# Patient Record
Sex: FEMALE | Race: WHITE | Employment: UNEMPLOYED
[De-identification: names, ages, dates, MRNs, and addresses within clinical notes are randomized per-mention and may not be internally consistent; named-entity substitution may affect disease eponyms.]

---

## 2017-09-03 ENCOUNTER — HEALTH MAINTENANCE LETTER (OUTPATIENT)
Age: 52
End: 2017-09-03

## 2018-05-14 ENCOUNTER — OFFICE VISIT (OUTPATIENT)
Dept: HEMATOLOGY | Facility: CLINIC | Age: 53
End: 2018-05-14
Attending: INTERNAL MEDICINE
Payer: COMMERCIAL

## 2018-05-14 VITALS
HEIGHT: 67 IN | SYSTOLIC BLOOD PRESSURE: 138 MMHG | DIASTOLIC BLOOD PRESSURE: 87 MMHG | RESPIRATION RATE: 14 BRPM | HEART RATE: 89 BPM | TEMPERATURE: 98.4 F | OXYGEN SATURATION: 95 %

## 2018-05-14 DIAGNOSIS — Z86.711 HISTORY OF PULMONARY EMBOLISM: ICD-10-CM

## 2018-05-14 DIAGNOSIS — I82.409 RECURRENT DEEP VEIN THROMBOSIS (DVT) (H): Primary | ICD-10-CM

## 2018-05-14 PROCEDURE — 99204 OFFICE O/P NEW MOD 45 MIN: CPT | Mod: GC | Performed by: INTERNAL MEDICINE

## 2018-05-14 PROCEDURE — G0463 HOSPITAL OUTPT CLINIC VISIT: HCPCS

## 2018-05-14 ASSESSMENT — PAIN SCALES - GENERAL: PAINLEVEL: NO PAIN (0)

## 2018-05-14 NOTE — MR AVS SNAPSHOT
"              After Visit Summary   2018    Caroline Barajas    MRN: [id number]           Patient Information     Date Of Birth          1965        Visit Information        Provider Department      2018 1:00 PM Arielle Benavides MD [facility]        Care Instructions    1. Continue anticoagulation with Abixiban as prescribed.  2. Return to clinic annually and as needed.           Follow-ups after your visit        Who to contact     If you have questions or need follow up information about today's clinic visit or your schedule please contact [facility] directly at [phone].  Normal or non-critical lab and imaging results will be communicated to you by Dondehart, letter or phone within 4 business days after the clinic has received the results. If you do not hear from us within 7 days, please contact the clinic through Dondehart or phone. If you have a critical or abnormal lab result, we will notify you by phone as soon as possible.  Submit refill requests through Egodeus or call your pharmacy and they will forward the refill request to us. Please allow 3 business days for your refill to be completed.          Additional Information About Your Visit        MyChart Information     Egodeus lets you send messages to your doctor, view your test results, renew your prescriptions, schedule appointments and more. To sign up, go to [email or web address] . Click on \"Log in\" on the left side of the screen, which will take you to the Welcome page. Then click on \"Sign up Now\" on the right side of the page.     You will be asked to enter the access code listed below, as well as some personal information. Please follow the directions to create your username and password.     Your access code is: [email or web address]  Expires: 2018  2:58 PM     Your access code will  in 90 days. If you need help or a new code, please call your [facility] clinic or " "[phone].        Care EveryWhere ID     This is your Care EveryWhere ID. This could be used by other organizations to access your [place] medical records  [id number]        Your Vitals Were     Pulse Temperature Respirations Height Pulse Oximetry       89 98.4  F (36.9  C) (Oral) 14 1.689 m (5' 6.5\") 95%        Blood Pressure from Last 3 Encounters:   05/14/18 138/87   04/09/08 120/80   03/25/08 120/80    Weight from Last 3 Encounters:   01/11/08 84.8 kg (187 lb)   02/23/07 81.3 kg (179 lb 3.2 oz)   01/17/07 83.2 kg (183 lb 8 oz)              Today, you had the following     No orders found for display       Primary Care Provider Office Phone # Fax #    Brian Davis [phone]       [address] [place] [address]        Equal Access to Services     [facility]RISTEL [facility] : Hadii aad ku hadasho Soomaali, waaxda luqadaha, qaybta kaalmada adeegyada, waxay tammyin hayrosalinan nikolay bales . So [facility] [phone].    ATENCIÓN: Si habla español, tiene a crocker disposición servicios gratuitos de asistencia lingüística. Llame al [phone].    We comply with applicable federal civil rights laws and [place] laws. We do not discriminate on the basis of race, color, national origin, age, disability, sex, sexual orientation, or gender identity.            Thank you!     Thank you for choosing [facility]  for your care. Our goal is always to provide you with excellent care. Hearing back from our patients is one way we can continue to improve our services. Please take a few minutes to complete the written survey that you may receive in the mail after your visit with us. Thank you!             Your Updated Medication List - Protect others around you: Learn how to safely use, store and throw away your medicines at www.disposemymeds.org.          This list is accurate as of 5/14/18  2:58 PM.  Always use your most recent med list.                   Brand Name Dispense " Instructions for use Diagnosis    AMBIEN 10 MG tablet   Generic drug:  zolpidem     45    1 1/2 Tablets AT BEDTIME    Insomnia, unspecified       diclofenac 75 MG EC tablet    VOLTAREN    1 MONTH    1 TABLET DAILY WITH FOOD    Displacement of lumbar intervertebral disc without myelopathy, Backache, unspecified, Thoracic or lumbosacral neuritis or radiculitis, unspecified       hydrochlorothiazide 25 MG tablet    HYDRODIURIL    3 months    ONE TABLET DAILY IN THE MORNING (due for appt)    Swelling of limb       NEURONTIN 300 MG capsule   Generic drug:  gabapentin     0    TWO CAPSULES TWICE DAILY    Displacement of lumbar intervertebral disc without myelopathy, Backache, unspecified, Thoracic or lumbosacral neuritis or radiculitis, unspecified       * oxyCODONE IR 5 MG tablet    ROXICODONE    120    ONE OR TWO TABLETS every 4 hours AS NEEDED FOR SEVERE PAIN, no more than 4 tabs in 24 hours    Backache, unspecified, Thoracic or lumbosacral neuritis or radiculitis, unspecified, Displacement of lumbar intervertebral disc without myelopathy       * oxyCODONE 80 MG 12 hr tablet    OxyCONTIN    1 MONTH    1 TABLET EVERY 12 HOURS    Displacement of lumbar intervertebral disc without myelopathy       predniSONE 20 MG tablet    DELTASONE    QS    60 MG DAILY FOR 3 DAYS, THEN 40 MG DAILY FOR 3 DAYS, THEN 20 MG DAILY FOR 3 DAYS, THEN STOP    Backache, unspecified       * traZODone 50 MG tablet    DESYREL    60    ONE TO TWO TABLETS AT BEDTIME AS NEEDED FOR SLEEP    Insomnia, unspecified       * traZODone 100 MG tablet    DESYREL    60    2 TABLETS AT BEDTIME AS NEEDED FOR SLEEP    Insomnia, unspecified       * Notice:  This list has 4 medication(s) that are the same as other medications prescribed for you. Read the directions carefully, and ask your doctor or other care provider to review them with you.

## 2018-05-14 NOTE — PATIENT INSTRUCTIONS
1. Continue anticoagulation with Abixiban as prescribed.  2. Return to clinic annually and as needed.

## 2018-05-14 NOTE — PROGRESS NOTES
HEMATOLOGY CONSULT NOTE  May 14, 2018    REASON FOR CONSULT: discussion regarding anticoagulation     HISTORY OF PRESENT ILLNESS     53-year-old female patient here for discussion regarding anticoagulation.  In 2007 her sister was diagnosed with factor V Leiden heterozygous.  Patient was tested at that time and was found to have Factor V Leiden homozygous.  She had no history of clots at that time and has had 2 normal pregnancies.  In summer 2011 she had a  long car ride.  In July 2011 she presented with worsening shortness of breath and was noted to have bilateral pulmonary emboli, multiple as well as left lower extremity DVT in the left side.  She was placed on anticoagulation with Lovenox and then switched to warfarin.  She also had an IVC filter placed at that time.IVC filter was later removed. She remained in warfarin for approximately 12 months. Warfarin was then stopped because of hair loss as a major side effect.  She was switched to Lovenox subcutaneous injection once daily.  In June 2015 she was again switched to fondaparinux, given the risk for bone loss with prolonged heparin therapy.  Patient did not want to take Xarelto at that point due to cost issues as well as due to worry about side effects.    Since being on fondaparinux since 2015 June , she was doing well until she had a large retroperitoneal hematoma in September 2016. Interestingly, the day before diagnosis she had had an endometrial biopsy, although she reports that she was having some right lower abdominal pain even before that procedure.  Anticoagulation was stopped and IVC filter was placed. On 12/5/16, while off anticoagulation, she developed a new  Right lower extremity DVT and clot in IVC filter. She was started on heparin and transitioned to dabigatran (Pradaxa),  and  IVC filter was removed on 2/13/17 after clot resolved. She was switched from pradaxa to apixiban due to insurance issues.    She is currently doing well on apixiban.  Denies any bleeding issues. Denies epistaxis, hematemesis, hematochezia, excessive menstrual bleed.  Her menstrual cycle is currently once in every 6 months.  She takes vitamin B12 supplementation and magnesium due to her gastric bypass surgery in 1998.  She denies any chest pain, shortness of breath, cough, N, nausea, vomiting, diarrhea.  She does note that her legs are swollen on and off.  She has used compression stockings at the time of long travel.  She denies any fevers or chills or unexplained weight loss.   PAST MEDICAL HISTORY     Patient Active Problem List   Diagnosis     Backache     Opioid type dependence, continuous (H)     Insomnia     Thoracic or lumbosacral neuritis or radiculitis, unspecified     Displacement of lumbar intervertebral disc without myelopathy     Edema     Primary hypercoagulable state (H)     No past surgical history on file.     Gastric bypass: 4/1997  Gall bladder 11/2017  Carpel tunnel  Tonsillectomy  H/o alcoholic hepatitis and hepatitc steatosis- hospitalized Jan 2017        CURRENT MEDICATIONS     Current Outpatient Prescriptions   Medication     NEURONTIN 300 MG OR CAPS     AMBIEN 10 MG OR TABS     DICLOFENAC SODIUM 75 MG OR TBEC     HYDROCHLOROTHIAZIDE 25 MG OR TABS     OXYCODONE HCL 5 MG OR TABS     OXYCODONE HCL## 80 MG OR TB12     PREDNISONE 20 MG OR TABS     TRAZODONE  MG OR TABS     TRAZODONE HCL 50 MG OR TABS     No current facility-administered medications for this visit.      Lunesta, not on ambien now   ALLERGIES    No Known Allergies     SOCIAL HISTORY     Social History     Social History Narrative     No narrative on file   Lives in [place] with daughter and   Smoke: never  Alcohol: never now. Past alcohol a lot  No illicit drugs   past  Stay at home mom       FAMILY HISTORY     Family History   Problem Relation Age of Onset     Blood Disease Sister      Factor V Leiden mutation and hx DVT     Sister: Factor v heterozygous  Daughter is  17. Son 26    Dad:,  from mesothelioma  Mom:livin,no clotting problems  Sister: one  Brother:2 fine     REVIEW OF SYSTEMS   As above in the HPI, o/w complete 12-point ROS was negative.     PHYSICAL EXAM   B/P: 138/87, T: 98.4, P: 89, R: 14  SpO2 Readings from Last 4 Encounters:   18 95%   07 95%     Wt Readings from Last 3 Encounters:   08 84.8 kg (187 lb)   07 81.3 kg (179 lb 3.2 oz)   07 83.2 kg (183 lb 8 oz)     GEN: NAD  HEENT: PERRL, EOMI, no icterus, injection or pallor. Oropharynx is clear.  NECK: no cervical or supraclavicular lymphadenopathy  LUNGS: clear bilaterally, no crackles  CV: regular, no murmurs, rubs, or gallops  ABDOMEN: Obese, well-healed surgical scars. soft, non-tender, non-distended, normal bowel sounds, no hepatosplenomegaly by percussion or palpation  EXT: warm, well perfused, no edema  NEURO: alert, oriented, cranial nerves II to XII intact  SKIN: no rashes     LABORATORY AND IMAGING STUDIES       Lab Results   Component Value Date    WBC 7.5 10/09/2008    HGB 11.5 (L) 10/09/2008    HCT 36.1 10/09/2008     10/09/2008    ALT 17 10/09/2008    AST 25 10/09/2008     10/09/2008    BUN 20 10/09/2008    CO2 27 10/09/2008    TSH 1.99 2007     Imagin2017: CTA chest  IMPRESSION:    1. Multiple large bilateral pulmonary emboli.  Mild enlargement of   right-sided cardiac chambers and pulmonary trunk suggesting elevated   right-sided pressures.  Findings were discussed with Kassandra Childress MD,   and with Dr. Krishna at approximately 1515 hours today.    2. Diffuse fatty liver.    2011:DVT  FINDINGS:  Right common femoral, femoral, popliteal, anterior tibial,   posterior tibial and peroneal veins are patent.  Left common femoral and   femoral vein is patent.  Non-occlusive clot is present in the mid   popliteal vein which becomes occlusive distally.  There is also occlusive   clot in the posterior tibial vein to the  ankle.  The anterior tibial vein   and peroneal veins are patent.  IMPRESSION:  Findings consistent with acute DVT of the left lower   extremity as described above.    7/19/2011: IVC filter:  IMPRESSION:    1.  Technically successful placement of an infrarenal IVC filter. The   patient tolerated the procedure well without immediate complication.   2.  Total fluoro time 0.9 minutes. Total dose 142 mGy.     9/29/11:IVC filter removal  IMPRESSION:  Successful Sandro Tulip inferior vena caval filter   Retrieval.  June 16 2015, fondaparinux.  9/25/2016: Ct Abd: large RP hematoma.  IMPRESSION:    1.  Significant increase in size of a right retroperitoneal hematoma,   tracking superiorly to the level of the right hepatic lobe, and inferiorly   into the pelvis to the level of the pubic symphysis.  There is evidence of   active extravasation of contrast laterally, as described, although the   arterial source of this bleeding is not determined and this may be venous   bleeding.  The hematoma has increased significantly in size as compared to   the prior examination.  Fluid-fluid levels in several locations within the   bleed indicate active ongoing bleeding.  The inferior vena cava is nearly   completely effaced as a result of the bleed, and the right kidney is   elevated as a result of the bleed as well.  2.  Severe hepatic steatosis.  3.  New right pleural effusion.  Bibasilar atelectasis is again noted.    4.  Loops of small bowel are increased in size as compared to previous   study.  Ileus is suspected, although developing small bowel obstruction   may be present.  Small bowel obstruction is felt to be less likely,   however.  No definite free intraperitoneal air or pneumatosis.      9/25/16:IVC filter placement  IMPRESSION:    1.  Successful placement of Argon Option ELITE retrievable IVC filter in   an infrarenal location.   2.  Please contact Interventional Radiology for filter removal once filter   is no longer  indicated.  Ideal removal is within three months.     12/5/16:DVT off blood thinners for 6 weeks.  IMPRESSION:  Acute DVT right popliteal vein and peroneal veins.  Started on pradaxa  12/6/16: IVC filter removal attempt:  IMPRESSION:  Attempt at removal of an infrarenal IVC filter. However,   there is thrombus over the hook and in the cone of the filter. Therefore,   it was not retrieved at this time. Patient can continue on anticoagulation   and return in one month for another attempt.    2/13/17:IVC filter removal:  IMPRESSION:  No definite IVC, thrombus, or thrombus within the filter.   Filter was removed.    Last 6 months approx: apixiban     ASSESSMENT AND PLAN     53-year-old female patient with factor V Leiden homozygous with history of recurrent DVT initially in the left lower extremity and then in the right lower extremity in the setting of absence of anticoagulation for a brief period of 6 weeks.  She has also had a history of pulmonary embolism at the time of initial diagnosis.  Given her history of recurrent clot we would recommend that she stay on lifelong anticoagulation.  She has had a history of gastric bypass.  She is currently taking apixaban twice a day.  We recommend that she continue apixaban.  Her major complication with anticoagulation in the past 6 years include a large retroperitoneal hematoma in September 2016.    We recommend that she follow-up with us once yearly in order to assess the risk versus benefit of anticoagulation.    Patient seen and discussed with Dr. Kennedy Drake [facility]  Hematology Oncology fellow    Attending Note:  I have reviewed the patient chart, and interviewed and examined the patient.  - I had a prolonged discussion with patient regarding the need for anticoagulation, the various options available, and that given her overall history with other anticoagulants, and her gastric bypass surgery, and insurance issues, that apixaban is the best option. Of  special note is that rivaroxaban is best absorbed when taken with food, so not clear how that is affected by the gastric bypass.   -She would benefit from f/u bilateral lower extremity ultrasound so that there is a new baseline for comparison. Best to have it done in [place], at the clinic/[facility] she is usually cared for.   -She also asked about her daughter, who may eventually use birth control. She is by definition at least homozygous for FVL and also with the family Hx, she should avoid estrogen-containing birth control. I don't think she specifically needs a special gynecologist for such a discussion, would start with her PCP or general gynecologist.  Good options include Mirena IUD and Nexplanon. Depo Provera has some increase risk of thombosis, even though it is progestin only, and progestin mini-pill is not reliable enough.  If she becomes pregnant, there is no reason for prophylactic enoxoaparin if she has not personally had a DVT.  I agree with the assessment and plan.  Arielle Benavides MD  Hematology

## 2018-05-15 NOTE — NURSING NOTE
Patient with history of recurrent DVT (left LE 7/2011- right LE 12/20160 and pulmonary embolism (7/2011) who is homozygous for Factor V Leiden  After discussion with Dr. eBnavides it was decided Mrs. Barajas would continue anticoagulation with Abixaban.   VTE risk factors, signs, and symptoms reviewed.  Anticoagulants discussed.   Patient instructions reviewed.  Mrs. Barajas has the contact information for the Center and was encouraged to call with questions or concerns.

## 2020-01-01 ENCOUNTER — TELEPHONE (OUTPATIENT)
Dept: GASTROENTEROLOGY | Facility: CLINIC | Age: 55
End: 2020-01-01

## 2020-01-01 ENCOUNTER — PRE VISIT (OUTPATIENT)
Dept: GASTROENTEROLOGY | Facility: CLINIC | Age: 55
End: 2020-01-01

## 2020-01-01 ENCOUNTER — HOSPITAL ENCOUNTER (INPATIENT)
Facility: CLINIC | Age: 55
LOS: 3 days | Discharge: HOME | DRG: 441 | End: 2020-11-10
Attending: INTERNAL MEDICINE | Admitting: INTERNAL MEDICINE
Payer: COMMERCIAL

## 2020-01-01 ENCOUNTER — TELEPHONE (OUTPATIENT)
Dept: HEMATOLOGY | Facility: CLINIC | Age: 55
End: 2020-01-01

## 2020-01-01 ENCOUNTER — APPOINTMENT (OUTPATIENT)
Dept: ULTRASOUND IMAGING | Facility: CLINIC | Age: 55
DRG: 441 | End: 2020-01-01
Attending: INTERNAL MEDICINE
Payer: COMMERCIAL

## 2020-01-01 ENCOUNTER — APPOINTMENT (OUTPATIENT)
Dept: PHYSICAL THERAPY | Facility: CLINIC | Age: 55
DRG: 441 | End: 2020-01-01
Attending: INTERNAL MEDICINE
Payer: COMMERCIAL

## 2020-01-01 ENCOUNTER — VIRTUAL VISIT (OUTPATIENT)
Dept: GASTROENTEROLOGY | Facility: CLINIC | Age: 55
End: 2020-01-01
Attending: STUDENT IN AN ORGANIZED HEALTH CARE EDUCATION/TRAINING PROGRAM
Payer: COMMERCIAL

## 2020-01-01 ENCOUNTER — TRANSFERRED RECORDS (OUTPATIENT)
Dept: HEALTH INFORMATION MANAGEMENT | Facility: CLINIC | Age: 55
End: 2020-01-01

## 2020-01-01 ENCOUNTER — DOCUMENTATION ONLY (OUTPATIENT)
Dept: GASTROENTEROLOGY | Facility: CLINIC | Age: 55
End: 2020-01-01

## 2020-01-01 ENCOUNTER — VIRTUAL VISIT (OUTPATIENT)
Dept: HEMATOLOGY | Facility: CLINIC | Age: 55
End: 2020-01-01
Attending: INTERNAL MEDICINE
Payer: COMMERCIAL

## 2020-01-01 ENCOUNTER — TELEPHONE (OUTPATIENT)
Facility: CLINIC | Age: 55
End: 2020-01-01

## 2020-01-01 ENCOUNTER — PATIENT OUTREACH (OUTPATIENT)
Dept: GASTROENTEROLOGY | Facility: CLINIC | Age: 55
End: 2020-01-01

## 2020-01-01 VITALS
SYSTOLIC BLOOD PRESSURE: 109 MMHG | RESPIRATION RATE: 16 BRPM | HEART RATE: 90 BPM | TEMPERATURE: 98.6 F | OXYGEN SATURATION: 98 % | DIASTOLIC BLOOD PRESSURE: 78 MMHG | WEIGHT: 187.9 LBS | BODY MASS INDEX: 29.87 KG/M2

## 2020-01-01 VITALS
TEMPERATURE: 97.6 F | OXYGEN SATURATION: 100 % | HEART RATE: 94 BPM | SYSTOLIC BLOOD PRESSURE: 111 MMHG | DIASTOLIC BLOOD PRESSURE: 70 MMHG | HEIGHT: 67 IN | WEIGHT: 177.9 LBS | BODY MASS INDEX: 27.92 KG/M2

## 2020-01-01 DIAGNOSIS — K76.82 HEPATIC ENCEPHALOPATHY (H): ICD-10-CM

## 2020-01-01 DIAGNOSIS — R17 JAUNDICE: ICD-10-CM

## 2020-01-01 DIAGNOSIS — F10.21 ALCOHOL USE DISORDER, SEVERE, IN EARLY REMISSION (H): ICD-10-CM

## 2020-01-01 DIAGNOSIS — D72.829 LEUKOCYTOSIS, UNSPECIFIED TYPE: ICD-10-CM

## 2020-01-01 DIAGNOSIS — Z53.9 ERRONEOUS ENCOUNTER--DISREGARD: Primary | ICD-10-CM

## 2020-01-01 DIAGNOSIS — K70.10 ALCOHOLIC HEPATITIS WITHOUT ASCITES (H): Primary | ICD-10-CM

## 2020-01-01 DIAGNOSIS — Z98.84 H/O GASTRIC BYPASS: ICD-10-CM

## 2020-01-01 DIAGNOSIS — K76.82 HEPATIC ENCEPHALOPATHY (H): Primary | ICD-10-CM

## 2020-01-01 DIAGNOSIS — F10.21 ALCOHOL USE DISORDER, SEVERE, IN EARLY REMISSION (H): Primary | ICD-10-CM

## 2020-01-01 DIAGNOSIS — D68.59 PRIMARY HYPERCOAGULABLE STATE (H): ICD-10-CM

## 2020-01-01 DIAGNOSIS — K70.11 ALCOHOLIC HEPATITIS WITH ASCITES (H): ICD-10-CM

## 2020-01-01 DIAGNOSIS — R17 ELEVATED BILIRUBIN: ICD-10-CM

## 2020-01-01 DIAGNOSIS — Z86.711 HISTORY OF PULMONARY EMBOLISM: Primary | ICD-10-CM

## 2020-01-01 LAB
ALBUMIN SERPL-MCNC: 1.6 G/DL (ref 3.4–5)
ALBUMIN SERPL-MCNC: 1.7 G/DL (ref 3.4–5)
ALBUMIN SERPL-MCNC: 1.8 G/DL (ref 3.4–5)
ALBUMIN SERPL-MCNC: 1.9 G/DL (ref 3.4–5)
ALBUMIN SERPL-MCNC: 1.9 G/DL (ref 3.4–5)
ALBUMIN SERPL-MCNC: 2 G/DL (ref 3.4–5)
ALP SERPL-CCNC: 168 U/L (ref 40–150)
ALP SERPL-CCNC: 171 U/L (ref 40–150)
ALP SERPL-CCNC: 183 U/L (ref 40–150)
ALP SERPL-CCNC: 194 U/L (ref 40–150)
ALP SERPL-CCNC: 216 U/L (ref 40–150)
ALT SERPL W P-5'-P-CCNC: 149 U/L (ref 0–50)
ALT SERPL W P-5'-P-CCNC: 152 U/L (ref 0–50)
ALT SERPL W P-5'-P-CCNC: 164 U/L (ref 0–50)
ALT SERPL W P-5'-P-CCNC: 199 U/L (ref 0–50)
ALT SERPL W P-5'-P-CCNC: 236 U/L (ref 0–50)
AMMONIA PLAS-SCNC: 13 UMOL/L (ref 10–50)
ANION GAP SERPL CALCULATED.3IONS-SCNC: 11 MMOL/L (ref 3–14)
ANION GAP SERPL CALCULATED.3IONS-SCNC: 12 MMOL/L (ref 3–14)
APTT PPP: 42 SEC (ref 22–37)
AST SERPL W P-5'-P-CCNC: 170 U/L (ref 0–45)
AST SERPL W P-5'-P-CCNC: 175 U/L (ref 0–45)
AST SERPL W P-5'-P-CCNC: 180 U/L (ref 0–45)
AST SERPL W P-5'-P-CCNC: 224 U/L (ref 0–45)
AST SERPL W P-5'-P-CCNC: 268 U/L (ref 0–45)
BASOPHILS # BLD AUTO: 0 10E9/L (ref 0–0.2)
BASOPHILS NFR BLD AUTO: 0.1 %
BILIRUB DIRECT SERPL-MCNC: 21.1 MG/DL (ref 0–0.2)
BILIRUB DIRECT SERPL-MCNC: 21.4 MG/DL (ref 0–0.2)
BILIRUB DIRECT SERPL-MCNC: 25.6 MG/DL (ref 0–0.2)
BILIRUB SERPL-MCNC: 24.2 MG/DL (ref 0.2–1.3)
BILIRUB SERPL-MCNC: 24.8 MG/DL (ref 0.2–1.3)
BILIRUB SERPL-MCNC: 27.6 MG/DL (ref 0.2–1.3)
BILIRUB SERPL-MCNC: 27.9 MG/DL (ref 0.2–1.3)
BILIRUB SERPL-MCNC: 30.2 MG/DL (ref 0.2–1.3)
BUN SERPL-MCNC: 16 MG/DL (ref 7–30)
BUN SERPL-MCNC: 17 MG/DL (ref 7–30)
CALCIUM SERPL-MCNC: 7.8 MG/DL (ref 8.5–10.1)
CALCIUM SERPL-MCNC: 8 MG/DL (ref 8.5–10.1)
CALCIUM SERPL-MCNC: 8 MG/DL (ref 8.5–10.1)
CALCIUM SERPL-MCNC: 8.4 MG/DL (ref 8.5–10.1)
CALCIUM SERPL-MCNC: 8.8 MG/DL (ref 8.5–10.1)
CHLORIDE SERPL-SCNC: 103 MMOL/L (ref 94–109)
CHLORIDE SERPL-SCNC: 109 MMOL/L (ref 94–109)
CHLORIDE SERPL-SCNC: 112 MMOL/L (ref 94–109)
CHLORIDE SERPL-SCNC: 113 MMOL/L (ref 94–109)
CHLORIDE SERPL-SCNC: 114 MMOL/L (ref 94–109)
CO2 SERPL-SCNC: 16 MMOL/L (ref 20–32)
CO2 SERPL-SCNC: 17 MMOL/L (ref 20–32)
CO2 SERPL-SCNC: 17 MMOL/L (ref 20–32)
CO2 SERPL-SCNC: 19 MMOL/L (ref 20–32)
CO2 SERPL-SCNC: 21 MMOL/L (ref 20–32)
CREAT SERPL-MCNC: 0.96 MG/DL (ref 0.52–1.04)
CREAT SERPL-MCNC: 0.96 MG/DL (ref 0.52–1.04)
CREAT SERPL-MCNC: 1.02 MG/DL (ref 0.52–1.04)
CREAT SERPL-MCNC: 1.03 MG/DL (ref 0.52–1.04)
CREAT SERPL-MCNC: 1.06 MG/DL (ref 0.52–1.04)
DIFFERENTIAL METHOD BLD: ABNORMAL
EOSINOPHIL # BLD AUTO: 0.1 10E9/L (ref 0–0.7)
EOSINOPHIL NFR BLD AUTO: 0.3 %
ERYTHROCYTE [DISTWIDTH] IN BLOOD BY AUTOMATED COUNT: 18.7 % (ref 10–15)
ERYTHROCYTE [DISTWIDTH] IN BLOOD BY AUTOMATED COUNT: 19.1 % (ref 10–15)
ERYTHROCYTE [DISTWIDTH] IN BLOOD BY AUTOMATED COUNT: 19.2 % (ref 10–15)
ERYTHROCYTE [DISTWIDTH] IN BLOOD BY AUTOMATED COUNT: 19.2 % (ref 10–15)
ERYTHROCYTE [DISTWIDTH] IN BLOOD BY AUTOMATED COUNT: 19.3 % (ref 10–15)
ERYTHROCYTE [DISTWIDTH] IN BLOOD BY AUTOMATED COUNT: 19.4 % (ref 10–15)
GFR SERPL CREATININE-BSD FRML MDRD: 60 ML/MIN/{1.73_M2}
GFR SERPL CREATININE-BSD FRML MDRD: 62 ML/MIN/{1.73_M2}
GFR SERPL CREATININE-BSD FRML MDRD: 62 ML/MIN/{1.73_M2}
GFR SERPL CREATININE-BSD FRML MDRD: 67 ML/MIN/{1.73_M2}
GFR SERPL CREATININE-BSD FRML MDRD: 67 ML/MIN/{1.73_M2}
GLUCOSE SERPL-MCNC: 101 MG/DL (ref 70–99)
GLUCOSE SERPL-MCNC: 139 MG/DL (ref 70–99)
GLUCOSE SERPL-MCNC: 148 MG/DL (ref 70–99)
GLUCOSE SERPL-MCNC: 87 MG/DL (ref 70–99)
GLUCOSE SERPL-MCNC: 93 MG/DL (ref 70–99)
HBV CORE AB SERPL QL IA: NONREACTIVE
HBV SURFACE AB SERPL IA-ACNC: 0.35 M[IU]/ML
HBV SURFACE AG SERPL QL IA: NONREACTIVE
HCT VFR BLD AUTO: 24.6 % (ref 35–47)
HCT VFR BLD AUTO: 25.3 % (ref 35–47)
HCT VFR BLD AUTO: 25.6 % (ref 35–47)
HCT VFR BLD AUTO: 26.5 % (ref 35–47)
HCT VFR BLD AUTO: 30.2 % (ref 35–47)
HCT VFR BLD AUTO: 30.6 % (ref 35–47)
HCV AB SERPL QL IA: NONREACTIVE
HGB BLD-MCNC: 7.7 G/DL (ref 11.7–15.7)
HGB BLD-MCNC: 8.1 G/DL (ref 11.7–15.7)
HGB BLD-MCNC: 8.3 G/DL (ref 11.7–15.7)
HGB BLD-MCNC: 8.4 G/DL (ref 11.7–15.7)
HGB BLD-MCNC: 9.5 G/DL (ref 11.7–15.7)
HGB BLD-MCNC: 9.8 G/DL (ref 11.7–15.7)
IMM GRANULOCYTES # BLD: 0.1 10E9/L (ref 0–0.4)
IMM GRANULOCYTES NFR BLD: 0.8 %
INR PPP: 1.8 (ref 0.86–1.14)
INR PPP: 1.85 (ref 0.86–1.14)
INR PPP: 1.87 (ref 0.86–1.14)
LYMPHOCYTES # BLD AUTO: 1.1 10E9/L (ref 0.8–5.3)
LYMPHOCYTES NFR BLD AUTO: 5.9 %
MAGNESIUM SERPL-MCNC: 2.2 MG/DL (ref 1.6–2.3)
MCH RBC QN AUTO: 34.2 PG (ref 26.5–33)
MCH RBC QN AUTO: 35 PG (ref 26.5–33)
MCH RBC QN AUTO: 35.1 PG (ref 26.5–33)
MCH RBC QN AUTO: 35.4 PG (ref 26.5–33)
MCH RBC QN AUTO: 35.7 PG (ref 26.5–33)
MCH RBC QN AUTO: 35.7 PG (ref 26.5–33)
MCHC RBC AUTO-ENTMCNC: 31.3 G/DL (ref 31.5–36.5)
MCHC RBC AUTO-ENTMCNC: 31.3 G/DL (ref 31.5–36.5)
MCHC RBC AUTO-ENTMCNC: 31.5 G/DL (ref 31.5–36.5)
MCHC RBC AUTO-ENTMCNC: 32 G/DL (ref 31.5–36.5)
MCHC RBC AUTO-ENTMCNC: 32 G/DL (ref 31.5–36.5)
MCHC RBC AUTO-ENTMCNC: 32.8 G/DL (ref 31.5–36.5)
MCV RBC AUTO: 109 FL (ref 78–100)
MCV RBC AUTO: 109 FL (ref 78–100)
MCV RBC AUTO: 111 FL (ref 78–100)
MCV RBC AUTO: 111 FL (ref 78–100)
MCV RBC AUTO: 112 FL (ref 78–100)
MCV RBC AUTO: 112 FL (ref 78–100)
MONOCYTES # BLD AUTO: 1.1 10E9/L (ref 0–1.3)
MONOCYTES NFR BLD AUTO: 5.9 %
NEUTROPHILS # BLD AUTO: 15.6 10E9/L (ref 1.6–8.3)
NEUTROPHILS NFR BLD AUTO: 87 %
NRBC # BLD AUTO: 0 10*3/UL
NRBC BLD AUTO-RTO: 0 /100
PLATELET # BLD AUTO: 188 10E9/L (ref 150–450)
PLATELET # BLD AUTO: 207 10E9/L (ref 150–450)
PLATELET # BLD AUTO: 214 10E9/L (ref 150–450)
PLATELET # BLD AUTO: 217 10E9/L (ref 150–450)
PLATELET # BLD AUTO: 224 10E9/L (ref 150–450)
PLATELET # BLD AUTO: 257 10E9/L (ref 150–450)
POTASSIUM SERPL-SCNC: 3 MMOL/L (ref 3.4–5.3)
POTASSIUM SERPL-SCNC: 3.2 MMOL/L (ref 3.4–5.3)
POTASSIUM SERPL-SCNC: 3.4 MMOL/L (ref 3.4–5.3)
POTASSIUM SERPL-SCNC: 3.5 MMOL/L (ref 3.4–5.3)
POTASSIUM SERPL-SCNC: 3.6 MMOL/L (ref 3.4–5.3)
POTASSIUM SERPL-SCNC: 3.7 MMOL/L (ref 3.4–5.3)
POTASSIUM SERPL-SCNC: 3.8 MMOL/L (ref 3.4–5.3)
POTASSIUM SERPL-SCNC: 4 MMOL/L (ref 3.4–5.3)
PROT SERPL-MCNC: 4.5 G/DL (ref 6.8–8.8)
PROT SERPL-MCNC: 4.6 G/DL (ref 6.8–8.8)
PROT SERPL-MCNC: 5 G/DL (ref 6.8–8.8)
PROT SERPL-MCNC: 5.2 G/DL (ref 6.8–8.8)
PROT SERPL-MCNC: 5.5 G/DL (ref 6.8–8.8)
RBC # BLD AUTO: 2.25 10E12/L (ref 3.8–5.2)
RBC # BLD AUTO: 2.27 10E12/L (ref 3.8–5.2)
RBC # BLD AUTO: 2.35 10E12/L (ref 3.8–5.2)
RBC # BLD AUTO: 2.37 10E12/L (ref 3.8–5.2)
RBC # BLD AUTO: 2.71 10E12/L (ref 3.8–5.2)
RBC # BLD AUTO: 2.77 10E12/L (ref 3.8–5.2)
SARS-COV-2 RNA SPEC QL NAA+PROBE: NOT DETECTED
SODIUM SERPL-SCNC: 136 MMOL/L (ref 133–144)
SODIUM SERPL-SCNC: 140 MMOL/L (ref 133–144)
SODIUM SERPL-SCNC: 142 MMOL/L (ref 133–144)
SPECIMEN SOURCE: NORMAL
WBC # BLD AUTO: 14.8 10E9/L (ref 4–11)
WBC # BLD AUTO: 14.8 10E9/L (ref 4–11)
WBC # BLD AUTO: 16.5 10E9/L (ref 4–11)
WBC # BLD AUTO: 18 10E9/L (ref 4–11)
WBC # BLD AUTO: 21 10E9/L (ref 4–11)
WBC # BLD AUTO: 24.5 10E9/L (ref 4–11)

## 2020-01-01 PROCEDURE — 86706 HEP B SURFACE ANTIBODY: CPT | Mod: 90 | Performed by: PATHOLOGY

## 2020-01-01 PROCEDURE — 99215 OFFICE O/P EST HI 40 MIN: CPT | Performed by: STUDENT IN AN ORGANIZED HEALTH CARE EDUCATION/TRAINING PROGRAM

## 2020-01-01 PROCEDURE — 80053 COMPREHEN METABOLIC PANEL: CPT | Performed by: INTERNAL MEDICINE

## 2020-01-01 PROCEDURE — 99232 SBSQ HOSP IP/OBS MODERATE 35: CPT | Performed by: INTERNAL MEDICINE

## 2020-01-01 PROCEDURE — 85730 THROMBOPLASTIN TIME PARTIAL: CPT | Performed by: INTERNAL MEDICINE

## 2020-01-01 PROCEDURE — 86803 HEPATITIS C AB TEST: CPT | Mod: 90 | Performed by: PATHOLOGY

## 2020-01-01 PROCEDURE — 97116 GAIT TRAINING THERAPY: CPT | Mod: GP | Performed by: PHYSICAL THERAPIST

## 2020-01-01 PROCEDURE — 250N000013 HC RX MED GY IP 250 OP 250 PS 637: Performed by: INTERNAL MEDICINE

## 2020-01-01 PROCEDURE — 99221 1ST HOSP IP/OBS SF/LOW 40: CPT | Performed by: INTERNAL MEDICINE

## 2020-01-01 PROCEDURE — 99223 1ST HOSP IP/OBS HIGH 75: CPT | Mod: AI | Performed by: INTERNAL MEDICINE

## 2020-01-01 PROCEDURE — 36415 COLL VENOUS BLD VENIPUNCTURE: CPT | Performed by: INTERNAL MEDICINE

## 2020-01-01 PROCEDURE — 86704 HEP B CORE ANTIBODY TOTAL: CPT | Mod: 90 | Performed by: PATHOLOGY

## 2020-01-01 PROCEDURE — 76705 ECHO EXAM OF ABDOMEN: CPT

## 2020-01-01 PROCEDURE — 99204 OFFICE O/P NEW MOD 45 MIN: CPT | Mod: 25 | Performed by: STUDENT IN AN ORGANIZED HEALTH CARE EDUCATION/TRAINING PROGRAM

## 2020-01-01 PROCEDURE — 80048 BASIC METABOLIC PNL TOTAL CA: CPT | Performed by: PATHOLOGY

## 2020-01-01 PROCEDURE — 83735 ASSAY OF MAGNESIUM: CPT | Performed by: PATHOLOGY

## 2020-01-01 PROCEDURE — 85027 COMPLETE CBC AUTOMATED: CPT | Performed by: INTERNAL MEDICINE

## 2020-01-01 PROCEDURE — 97161 PT EVAL LOW COMPLEX 20 MIN: CPT | Mod: GP | Performed by: PHYSICAL THERAPIST

## 2020-01-01 PROCEDURE — 250N000011 HC RX IP 250 OP 636: Performed by: INTERNAL MEDICINE

## 2020-01-01 PROCEDURE — 80048 BASIC METABOLIC PNL TOTAL CA: CPT | Performed by: INTERNAL MEDICINE

## 2020-01-01 PROCEDURE — 99233 SBSQ HOSP IP/OBS HIGH 50: CPT | Performed by: INTERNAL MEDICINE

## 2020-01-01 PROCEDURE — 99358 PROLONG SERVICE W/O CONTACT: CPT | Mod: 25 | Performed by: STUDENT IN AN ORGANIZED HEALTH CARE EDUCATION/TRAINING PROGRAM

## 2020-01-01 PROCEDURE — 36415 COLL VENOUS BLD VENIPUNCTURE: CPT | Performed by: PATHOLOGY

## 2020-01-01 PROCEDURE — 85610 PROTHROMBIN TIME: CPT | Performed by: INTERNAL MEDICINE

## 2020-01-01 PROCEDURE — 82040 ASSAY OF SERUM ALBUMIN: CPT | Performed by: INTERNAL MEDICINE

## 2020-01-01 PROCEDURE — 80076 HEPATIC FUNCTION PANEL: CPT | Performed by: PATHOLOGY

## 2020-01-01 PROCEDURE — 82140 ASSAY OF AMMONIA: CPT | Performed by: INTERNAL MEDICINE

## 2020-01-01 PROCEDURE — 84132 ASSAY OF SERUM POTASSIUM: CPT | Performed by: INTERNAL MEDICINE

## 2020-01-01 PROCEDURE — 99232 SBSQ HOSP IP/OBS MODERATE 35: CPT | Performed by: PHYSICIAN ASSISTANT

## 2020-01-01 PROCEDURE — 85025 COMPLETE CBC W/AUTO DIFF WBC: CPT | Performed by: INTERNAL MEDICINE

## 2020-01-01 PROCEDURE — 85027 COMPLETE CBC AUTOMATED: CPT | Performed by: PATHOLOGY

## 2020-01-01 PROCEDURE — 97530 THERAPEUTIC ACTIVITIES: CPT | Mod: GP | Performed by: PHYSICAL THERAPIST

## 2020-01-01 PROCEDURE — 80076 HEPATIC FUNCTION PANEL: CPT | Performed by: INTERNAL MEDICINE

## 2020-01-01 PROCEDURE — 85610 PROTHROMBIN TIME: CPT | Performed by: PATHOLOGY

## 2020-01-01 PROCEDURE — 99000 SPECIMEN HANDLING OFFICE-LAB: CPT | Performed by: PATHOLOGY

## 2020-01-01 PROCEDURE — 99239 HOSP IP/OBS DSCHRG MGMT >30: CPT | Performed by: INTERNAL MEDICINE

## 2020-01-01 PROCEDURE — 87340 HEPATITIS B SURFACE AG IA: CPT | Mod: 90 | Performed by: PATHOLOGY

## 2020-01-01 PROCEDURE — 120N000001 HC R&B MED SURG/OB

## 2020-01-01 PROCEDURE — G0463 HOSPITAL OUTPT CLINIC VISIT: HCPCS

## 2020-01-01 PROCEDURE — U0003 INFECTIOUS AGENT DETECTION BY NUCLEIC ACID (DNA OR RNA); SEVERE ACUTE RESPIRATORY SYNDROME CORONAVIRUS 2 (SARS-COV-2) (CORONAVIRUS DISEASE [COVID-19]), AMPLIFIED PROBE TECHNIQUE, MAKING USE OF HIGH THROUGHPUT TECHNOLOGIES AS DESCRIBED BY CMS-2020-01-R: HCPCS | Performed by: INTERNAL MEDICINE

## 2020-01-01 RX ORDER — FUROSEMIDE 20 MG
20 TABLET ORAL DAILY
COMMUNITY
Start: 2020-01-01 | End: 2020-01-01

## 2020-01-01 RX ORDER — LANOLIN ALCOHOL/MO/W.PET/CERES
3 CREAM (GRAM) TOPICAL
COMMUNITY
Start: 2020-01-01

## 2020-01-01 RX ORDER — LEVOTHYROXINE SODIUM 88 UG/1
88 TABLET ORAL AT BEDTIME
COMMUNITY
Start: 2020-01-01

## 2020-01-01 RX ORDER — POLYETHYLENE GLYCOL 3350 17 G/17G
17 POWDER, FOR SOLUTION ORAL DAILY
Status: DISCONTINUED | OUTPATIENT
Start: 2020-01-01 | End: 2020-01-01 | Stop reason: HOSPADM

## 2020-01-01 RX ORDER — CALCIUM CARBONATE 500 MG/1
400 TABLET, CHEWABLE ORAL EVERY 4 HOURS PRN
Status: ON HOLD | COMMUNITY
Start: 2020-01-01 | End: 2020-01-01

## 2020-01-01 RX ORDER — LEVOTHYROXINE SODIUM 88 UG/1
88 TABLET ORAL DAILY
Status: DISCONTINUED | OUTPATIENT
Start: 2020-01-01 | End: 2020-01-01 | Stop reason: HOSPADM

## 2020-01-01 RX ORDER — QUETIAPINE FUMARATE 100 MG/1
300 TABLET, FILM COATED ORAL AT BEDTIME
Status: DISCONTINUED | OUTPATIENT
Start: 2020-01-01 | End: 2020-01-01 | Stop reason: HOSPADM

## 2020-01-01 RX ORDER — MIDODRINE HYDROCHLORIDE 10 MG/1
10 TABLET ORAL 3 TIMES DAILY
COMMUNITY
Start: 2020-01-01

## 2020-01-01 RX ORDER — PANTOPRAZOLE SODIUM 40 MG/1
40 TABLET, DELAYED RELEASE ORAL AT BEDTIME
COMMUNITY
Start: 2020-01-01

## 2020-01-01 RX ORDER — HEPARIN SODIUM 10000 [USP'U]/100ML
0-5000 INJECTION, SOLUTION INTRAVENOUS CONTINUOUS
Status: DISCONTINUED | OUTPATIENT
Start: 2020-01-01 | End: 2020-01-01

## 2020-01-01 RX ORDER — AMOXICILLIN 250 MG
1 CAPSULE ORAL 2 TIMES DAILY
Status: DISCONTINUED | OUTPATIENT
Start: 2020-01-01 | End: 2020-01-01 | Stop reason: HOSPADM

## 2020-01-01 RX ORDER — POTASSIUM CHLORIDE 1500 MG/1
20 TABLET, EXTENDED RELEASE ORAL ONCE
Status: COMPLETED | OUTPATIENT
Start: 2020-01-01 | End: 2020-01-01

## 2020-01-01 RX ORDER — MIDODRINE HYDROCHLORIDE 5 MG/1
10 TABLET ORAL 3 TIMES DAILY
Status: DISCONTINUED | OUTPATIENT
Start: 2020-01-01 | End: 2020-01-01 | Stop reason: HOSPADM

## 2020-01-01 RX ORDER — PROMETHAZINE HYDROCHLORIDE 25 MG/1
1 TABLET ORAL EVERY 4 HOURS PRN
COMMUNITY
Start: 2020-01-01

## 2020-01-01 RX ORDER — POTASSIUM CHLORIDE 1500 MG/1
40 TABLET, EXTENDED RELEASE ORAL ONCE
Status: COMPLETED | OUTPATIENT
Start: 2020-01-01 | End: 2020-01-01

## 2020-01-01 RX ORDER — POTASSIUM CHLORIDE 1500 MG/1
40 TABLET, EXTENDED RELEASE ORAL ONCE
Status: DISCONTINUED | OUTPATIENT
Start: 2020-01-01 | End: 2020-01-01

## 2020-01-01 RX ORDER — PANTOPRAZOLE SODIUM 40 MG/1
40 TABLET, DELAYED RELEASE ORAL AT BEDTIME
Status: DISCONTINUED | OUTPATIENT
Start: 2020-01-01 | End: 2020-01-01 | Stop reason: HOSPADM

## 2020-01-01 RX ORDER — CEFTRIAXONE 2 G/1
2 INJECTION, POWDER, FOR SOLUTION INTRAMUSCULAR; INTRAVENOUS EVERY 24 HOURS
Status: DISCONTINUED | OUTPATIENT
Start: 2020-01-01 | End: 2020-01-01 | Stop reason: HOSPADM

## 2020-01-01 RX ORDER — LACTULOSE 10 G/15ML
15 SOLUTION ORAL DAILY
COMMUNITY
Start: 2020-01-01

## 2020-01-01 RX ORDER — APIXABAN 2.5 MG/1
2.5 TABLET, FILM COATED ORAL 2 TIMES DAILY
Status: ON HOLD | COMMUNITY
Start: 2020-01-01 | End: 2020-01-01

## 2020-01-01 RX ORDER — MULTIPLE VITAMINS W/ MINERALS TAB 9MG-400MCG
1 TAB ORAL DAILY
COMMUNITY

## 2020-01-01 RX ORDER — LIDOCAINE 40 MG/G
CREAM TOPICAL
Status: DISCONTINUED | OUTPATIENT
Start: 2020-01-01 | End: 2020-01-01 | Stop reason: HOSPADM

## 2020-01-01 RX ORDER — QUETIAPINE FUMARATE 300 MG/1
300 TABLET, FILM COATED ORAL AT BEDTIME
COMMUNITY
Start: 2020-01-01

## 2020-01-01 RX ORDER — SPIRONOLACTONE 50 MG/1
50 TABLET, FILM COATED ORAL DAILY
COMMUNITY
Start: 2020-01-01 | End: 2020-01-01

## 2020-01-01 RX ORDER — LACTULOSE 10 G/15ML
15 SOLUTION ORAL DAILY
Status: DISCONTINUED | OUTPATIENT
Start: 2020-01-01 | End: 2020-01-01 | Stop reason: HOSPADM

## 2020-01-01 RX ORDER — AMOXICILLIN 250 MG
2 CAPSULE ORAL 2 TIMES DAILY
Status: DISCONTINUED | OUTPATIENT
Start: 2020-01-01 | End: 2020-01-01 | Stop reason: HOSPADM

## 2020-01-01 RX ADMIN — QUETIAPINE FUMARATE 300 MG: 100 TABLET ORAL at 19:58

## 2020-01-01 RX ADMIN — QUETIAPINE FUMARATE 300 MG: 100 TABLET ORAL at 04:59

## 2020-01-01 RX ADMIN — RIFAXIMIN 550 MG: 550 TABLET ORAL at 20:12

## 2020-01-01 RX ADMIN — RIFAXIMIN 550 MG: 550 TABLET ORAL at 11:18

## 2020-01-01 RX ADMIN — MIDODRINE HYDROCHLORIDE 10 MG: 5 TABLET ORAL at 09:25

## 2020-01-01 RX ADMIN — MIDODRINE HYDROCHLORIDE 10 MG: 5 TABLET ORAL at 20:44

## 2020-01-01 RX ADMIN — POTASSIUM CHLORIDE 40 MEQ: 1500 TABLET, EXTENDED RELEASE ORAL at 09:45

## 2020-01-01 RX ADMIN — APIXABAN 2.5 MG: 2.5 TABLET, FILM COATED ORAL at 20:43

## 2020-01-01 RX ADMIN — LEVOTHYROXINE SODIUM 88 MCG: 0.09 TABLET ORAL at 08:25

## 2020-01-01 RX ADMIN — QUETIAPINE FUMARATE 300 MG: 100 TABLET ORAL at 20:12

## 2020-01-01 RX ADMIN — RIFAXIMIN 550 MG: 550 TABLET ORAL at 09:44

## 2020-01-01 RX ADMIN — MIDODRINE HYDROCHLORIDE 10 MG: 5 TABLET ORAL at 09:45

## 2020-01-01 RX ADMIN — MIDODRINE HYDROCHLORIDE 10 MG: 5 TABLET ORAL at 13:18

## 2020-01-01 RX ADMIN — MIDODRINE HYDROCHLORIDE 10 MG: 5 TABLET ORAL at 19:58

## 2020-01-01 RX ADMIN — RIFAXIMIN 550 MG: 550 TABLET ORAL at 20:43

## 2020-01-01 RX ADMIN — PANTOPRAZOLE SODIUM 40 MG: 40 TABLET, DELAYED RELEASE ORAL at 20:14

## 2020-01-01 RX ADMIN — LACTULOSE 15 ML: 20 SOLUTION ORAL at 09:26

## 2020-01-01 RX ADMIN — PANTOPRAZOLE SODIUM 40 MG: 40 TABLET, DELAYED RELEASE ORAL at 22:31

## 2020-01-01 RX ADMIN — CEFTRIAXONE 2 G: 2 INJECTION, POWDER, FOR SOLUTION INTRAMUSCULAR; INTRAVENOUS at 03:45

## 2020-01-01 RX ADMIN — CEFTRIAXONE 2 G: 2 INJECTION, POWDER, FOR SOLUTION INTRAMUSCULAR; INTRAVENOUS at 03:31

## 2020-01-01 RX ADMIN — MIDODRINE HYDROCHLORIDE 10 MG: 5 TABLET ORAL at 14:26

## 2020-01-01 RX ADMIN — MIDODRINE HYDROCHLORIDE 10 MG: 5 TABLET ORAL at 14:23

## 2020-01-01 RX ADMIN — LEVOTHYROXINE SODIUM 88 MCG: 0.09 TABLET ORAL at 09:45

## 2020-01-01 RX ADMIN — CEFTRIAXONE 2 G: 2 INJECTION, POWDER, FOR SOLUTION INTRAMUSCULAR; INTRAVENOUS at 04:33

## 2020-01-01 RX ADMIN — APIXABAN 2.5 MG: 2.5 TABLET, FILM COATED ORAL at 09:45

## 2020-01-01 RX ADMIN — QUETIAPINE FUMARATE 300 MG: 100 TABLET ORAL at 22:31

## 2020-01-01 RX ADMIN — MIDODRINE HYDROCHLORIDE 10 MG: 5 TABLET ORAL at 10:52

## 2020-01-01 RX ADMIN — POTASSIUM CHLORIDE 20 MEQ: 1500 TABLET, EXTENDED RELEASE ORAL at 15:29

## 2020-01-01 RX ADMIN — POTASSIUM CHLORIDE 40 MEQ: 1500 TABLET, EXTENDED RELEASE ORAL at 13:18

## 2020-01-01 RX ADMIN — CEFTRIAXONE 2 G: 2 INJECTION, POWDER, FOR SOLUTION INTRAMUSCULAR; INTRAVENOUS at 03:06

## 2020-01-01 RX ADMIN — APIXABAN 2.5 MG: 2.5 TABLET, FILM COATED ORAL at 09:25

## 2020-01-01 RX ADMIN — LEVOTHYROXINE SODIUM 88 MCG: 0.09 TABLET ORAL at 09:26

## 2020-01-01 RX ADMIN — LEVOTHYROXINE SODIUM 88 MCG: 0.09 TABLET ORAL at 11:18

## 2020-01-01 RX ADMIN — MIDODRINE HYDROCHLORIDE 10 MG: 5 TABLET ORAL at 15:11

## 2020-01-01 RX ADMIN — MIDODRINE HYDROCHLORIDE 10 MG: 5 TABLET ORAL at 08:25

## 2020-01-01 RX ADMIN — MIDODRINE HYDROCHLORIDE 10 MG: 5 TABLET ORAL at 20:13

## 2020-01-01 RX ADMIN — LACTULOSE 15 ML: 20 SOLUTION ORAL at 09:45

## 2020-01-01 RX ADMIN — RIFAXIMIN 550 MG: 550 TABLET ORAL at 19:58

## 2020-01-01 RX ADMIN — RIFAXIMIN 550 MG: 550 TABLET ORAL at 09:25

## 2020-01-01 RX ADMIN — RIFAXIMIN 550 MG: 550 TABLET ORAL at 08:25

## 2020-01-01 SDOH — HEALTH STABILITY: MENTAL HEALTH: HOW OFTEN DO YOU HAVE A DRINK CONTAINING ALCOHOL?: NOT ASKED

## 2020-01-01 SDOH — HEALTH STABILITY: MENTAL HEALTH: HOW OFTEN DO YOU HAVE 6 OR MORE DRINKS ON ONE OCCASION?: NOT ASKED

## 2020-01-01 SDOH — HEALTH STABILITY: MENTAL HEALTH: HOW MANY STANDARD DRINKS CONTAINING ALCOHOL DO YOU HAVE ON A TYPICAL DAY?: NOT ASKED

## 2020-01-01 ASSESSMENT — ACTIVITIES OF DAILY LIVING (ADL)
ADLS_ACUITY_SCORE: 19
ADLS_ACUITY_SCORE: 19
ADLS_ACUITY_SCORE: 20
ADLS_ACUITY_SCORE: 19
ADLS_ACUITY_SCORE: 17
ADLS_ACUITY_SCORE: 19
ADLS_ACUITY_SCORE: 17
ADLS_ACUITY_SCORE: 19
ADLS_ACUITY_SCORE: 19
ADLS_ACUITY_SCORE: 20
ADLS_ACUITY_SCORE: 17
ADLS_ACUITY_SCORE: 17
ADLS_ACUITY_SCORE: 19
ADLS_ACUITY_SCORE: 17
ADLS_ACUITY_SCORE: 19
ADLS_ACUITY_SCORE: 20
ADLS_ACUITY_SCORE: 20
ADLS_ACUITY_SCORE: 17

## 2020-01-01 ASSESSMENT — MIFFLIN-ST. JEOR: SCORE: 1436.64

## 2020-01-01 ASSESSMENT — PAIN SCALES - GENERAL
PAINLEVEL: NO PAIN (0)
PAINLEVEL: NO PAIN (0)

## 2020-10-30 NOTE — TELEPHONE ENCOUNTER
RECORDS RECEIVED FROM: RIANNA   Appt Date: 11.03.2020   NOTES STATUS DETAILS   OFFICE NOTE from referring provider N/A    OFFICE NOTES from other specialists N/A    [facility] hospital [facility]   MEDICATION LIST N/A    LIVER BIOSPY (IF APPLICABLE)      PATHOLOGY REPORTS  N/A    IMAGING     ENDOSCOPY (IF AVAILABLE) N/A    COLONOSCOPY (IF AVAILABLE) N/A    ULTRASOUND LIVER Care Everywhere 10.12.2020 US ABD LTD   CT OF ABDOMEN Care Everywhere 10.10.2020 CT ABD WITHOUT AND WITH IV CONTRAST   MRI OF LIVER N/A    FIBROSCAN, US ELASTOGRAPHY, FIBROSIS SCAN, MR ELASTOGRAPHY N/A    LABS     HEPATIC PANEL (LIVER PANEL) Care Everywhere 10.28.2020   BASIC METABOLIC PANEL Care Everywhere 10.28.2020   COMPLETE METABOLIC PANEL Care Everywhere 10.11.2020   COMPLETE BLOOD COUNT (CBC) Care Everywhere 10.11.2020   INTERNATIONAL NORMALIZED RATIO (INR) N/A    HEPATITIS C ANTIBODY N/A    HEPATITIS C VIRAL LOAD/PCR N/A    HEPATITIS C GENOTYPE N/A    HEPATITIS B SURFACE ANTIGEN N/A    HEPATITIS B SURFACE ANTIBODY N/A    HEPATITIS B DNA QUANT LEVEL N/A    HEPATITIS B CORE ANTIBODY N/A      Action 10.30.2020 RM   Action Taken Called [facility] to get images pushed over, called [phone] spoke with a rep who will be pushing images over, pending.    10.30.2020 Both images received and uploaded to the pt chart.         Rounded on patient via camera due to patient being covid positive  Patient lives at home alone at [place]  She does NOT wear oxygen. Patient wearing oxygen in hospital. Will need to evaluate for home oxygen if needed. 1st dose of Remdisivir scheduled for this afternoon.    Informed patient she may be discharged tomorrow or next day. Patient prefers Friday. Discussed quarantine instructions after discharge to home. Patient verbalizes understanding.           08/05/20 [id number]   Discharge Reassessment   Assessment Type Discharge Planning Reassessment   Provided patient/caregiver education on the expected discharge date and the discharge plan Yes   Discharge Plan A Home   DME Needed Upon Discharge  none

## 2020-11-03 NOTE — PROGRESS NOTES
"Caroline Barajas is a 55 year old female who is being evaluated via a billable telephone visit during the COVID-19 pandemic and clinic response to limit in-person visits.  The patient has been notified of following:   \"This telephone visit will be conducted via a call between you and your physician/provider. We have found that certain health care needs can be provided without the need for a physical exam.  This service lets us provide the care you need with a short phone conversation.  If a prescription is necessary we can send it directly to your pharmacy.  If lab work is needed we can place an order for that and you can then stop by our lab to have the test done at a later time.  If during the course of the call the physician/provider feels a telephone visit is not appropriate, you will not be charged for this service.\" Telephone visits are billed at different rates depending on your insurance coverage. During this emergency period, for some insurers they may be billed the same as an in-person visit.  Please reach out to your insurance provider with any questions.  Consent has been obtained for this service by 1 care team member: yes  I have reviewed and updated the patient's Past Medical History, Social History, Family History and Medication List.    What phone number would you like to be contacted at?     Phone call contact time  Call Started at 10:30  Call Ended at 11:15  On phone patient and             "

## 2020-11-03 NOTE — LETTER
"    11/3/2020         RE: Caroline Barajas  [address] [facility] [id number]        Dear Colleague,    Thank you for referring your patient, Caroline Barajas, to the [facility] [place]. Please see a copy of my visit note below.    [facility] New Patient Visit    Date of Visit: November 2, 2020    Reason for referral: Severe alcoholic hepatitis    Subjective: Ms. Barajas is a 55 year old woman with a history of alcohol use disorder, admissions for alcoholic hepatitis 1/2017 and 11/2017, who presents for follow up severe alcoholic hepatitis.     Unable to do video visit 2/2 technical difficulties, converted to phone visit. History obtained from the patient and her .     1/2017: Hospitalized with jaundice, BR peaked at 7. She was able to remain sober for a few months but returned to drinking. CT showed fatty liver this admission.  11/2017: Hospitalized with jaundice, BR peaked at 13. Was sober for a few months after this but returned to drinking. Also thought to have cholecystitis. She had RUQ US that was c/w acute cholecystitis, dilated CBD. MRCP showed a steatotic liver but otherwise normal GB. CBD 8 mm.She underwent surgery for this - ANGELINA, YENNI, and intraoperative cholangiogram that was negative. She had gallstones but gallbladder appeared grossly normal. She stopped drink for a few months after this.     She reports completing inpatient alcohol treatment prior to these admissions because of an \"incident\". She has not done any alcohol treatment since her first episode of AH 1/2017. No history of DUIs or trouble with the law related to drinking     Admitted to the hospital 10/10/2020 when she was found to be non responsive by her , taken to the ambulance. She had been declining the days prior to this, her  was bringing her food and helping with the bathroom. He didn't noticed how jaundiced she had been until the ambulance brought her " outside. She states she was slowly declining and stopped drinking because she was feeling poorly. Last drink was the end of September 2020.     She was admitted to [place] 10/10-16 for jaundice and AMS. DF was 65 on admission (BR 29.1), so she was started on steroids that were later stopped 2/2 UTI. She had a RUQ that showed hepatic steatosis with trace ascites. No biliary dilation. She had a head CT on admission that was negative. She was discharged to a TCU 10/17, but developed slight NOAM (creatinine max 1.45) that improved with albumin and midodrine, so was transferred back to the hospital. On 10/23, she was restarted on steroids given a repeat UA was negative. BR was 28.7 on 10/23. Discharged back to TCU 10/26. 10/30 BR was 27.3 with Lille score of 0.762. Creatinine was normal on discharge. She did not have a para this admission and only had trace ascites on imaging.     There is documentation that she was seen by CD in consultation with recommendation to contact local JUSTINE facilities, and was given a resource list for this. She does not recall receiving this information, and has not set anything up yet. She was just discharge from her TCU this AM. She states she wants to stop drinking.     She states her spironolactone was held the AM was discharge 2/2 hypotension, but she received lasix 20 mg. She is prescribed lasix 20, pk 50. She is not having significant abdominal swelling or LE swelling. She is also on rifaximin and lactulose. Is taking midodrine.     Patient and her  were told she is not a transplant candidate currently.     Still working on getting strength, getting home PT.     ROS: 14 point ROS negative except for positives noted in HPI.    PMHx:  Past Medical History:   Diagnosis Date     BACKACHE NOS 1/19/2007     EDEMA 3/26/2008     INSOMNIA NEC 1/19/2007     LUMBAR DISC DISPLACEMENT 2/13/2007     LUMBOSACRAL NEURITIS NOS 2/13/2007     OPIOID DEPENDENCE-CONTIN 1/19/2007     PRIMARY  HYPERCOAGULABLE STATE 3/26/2008    HOMOZYGOUS FOR FACTOR V LEIDEN MUTATION PER PT REPORT   Alcoholic hepatitis x3    PSHx:  Gastric bypass surgery  CCY     FamHx:  Family History   Problem Relation Age of Onset     Blood Disease Sister         Factor V Leiden mutation and hx DVT       SocHx:  Social History     Socioeconomic History     Marital status:      Spouse name: Not on file     Number of children: Not on file     Years of education: Not on file     Highest education level: Not on file   Occupational History     Not on file   Social Needs     Financial resource strain: Not on file     Food insecurity     Worry: Not on file     Inability: Not on file     Transportation needs     Medical: Not on file     Non-medical: Not on file   Tobacco Use     Smoking status: Never Smoker     Smokeless tobacco: Never Used   Substance and Sexual Activity     Alcohol use: Not Currently     Comment: Last drink 9/30/2020     Drug use: Not Currently     Comment: past marijuana use     Sexual activity: Not on file   Lifestyle     Physical activity     Days per week: Not on file     Minutes per session: Not on file     Stress: Not on file   Relationships     Social connections     Talks on phone: Not on file     Gets together: Not on file     Attends Anglican service: Not on file     Active member of club or organization: Not on file     Attends meetings of clubs or organizations: Not on file     Relationship status: Not on file     Intimate partner violence     Fear of current or ex partner: Not on file     Emotionally abused: Not on file     Physically abused: Not on file     Forced sexual activity: Not on file   Other Topics Concern     Not on file   Social History Narrative     Not on file   Previously working, used to work as a  stopped > 10 years ago, stopped as she was caring for her two kids  Lives at home with  and daughter (20). Has a son as well who lives in [place].      Medications:  Current Outpatient Medications   Medication     calcium carbonate (TUMS) 500 MG chewable tablet     CONSTULOSE 10 GM/15ML solution     ELIQUIS ANTICOAGULANT 2.5 MG tablet     furosemide (LASIX) 20 MG tablet     levothyroxine (SYNTHROID/LEVOTHROID) 88 MCG tablet     melatonin 3 MG tablet     midodrine (PROAMATINE) 10 MG tablet     pantoprazole (PROTONIX) 40 MG EC tablet     prednisoLONE (PRELONE) 15 MG/5ML syrup     promethazine (PHENERGAN) 25 MG tablet     QUEtiapine (SEROQUEL) 300 MG tablet     rifaximin (XIFAXAN) 550 MG TABS tablet     spironolactone (ALDACTONE) 50 MG tablet     No current facility-administered medications for this visit.    She is not taking the eliquis currently    Allergies:  No Known Allergies    Objective:  No vitals or exam for this telephone visit    Labs:  11/2/2020  Na 141  K 3.7  Creatinine 0.91  Albumin 2.7      TBR 29.3   Alk phos 141  INR 1.6    MELD Na 24    Imaging:    CT 1/2017    IMPRESSION:    1.  Pronounced hepatic fatty infiltration with hepatomegaly as on the   comparison September study.  Decrease in the right retroperitoneal   hemorrhage with a relatively small collection remaining measuring   approximately 3 x 5 cm in transverse dimension.    2.  Cholelithiasis.     CT 10/11/2020        IMPRESSION:    1.  Severe hepatic steatosis.  No focal hepatic lesions.        2.  Diffusely thickened partially visualized colonic wall.  Findings are    consistent with colitis, favor inflammatory etiology.      RUQ US 10/23/2020    FINDINGS:    Included portions of the pancreas has normal appearance. Diffuse increased echogenicity with   attenuation of sound involving the liver. No discrete mass. Common bile duct measures 6 mm in   diameter. Right kidney is 11.8 cm in length without collecting system dilation. Trace volume   ascites.  No gallbladder identified.    IMPRESSION:  1.  Hepatic steatosis without hepatic mass.  2.  Trace  ascites.    Endoscopy:    EGD 10/15/2020    Findings:       The examined esophagus was normal. No varices       Evidence of a gastric bypass was found. A gastric pouch with a small        size was found. Unable to retroflex. The staple line appeared intact. A        stitch with granulation tissue noted and a couple of traction        diverticulae noted. Tiny ulcer by GJ anastomosis noted, clean based.       The examined jejunum was normal.    Impression:            - Normal esophagus.                         - Gastric bypass with a small-sized pouch and intact                          staple line. Tiny ulcer by GJ anastomosis noted, clean                          based.                         - Normal examined jejunum.                         - No specimens collected.      Assessment/Plan:  Ms. Barajas is a 55 year old woman with a history of alcohol use disorder, admissions for alcoholic hepatitis 1/2017 and 11/2017, who presents for follow up severe alcoholic hepatitis. She also has a history of obesity s/p RNYGB.     She presented with a third episode of alcoholic hepatitis 10/2020. Last drink 9/2020. She have severe alcoholic hepatitis, was started on steroids. Steroids were stopped briefly for a UTI, but then restarted 10/23. Unfortunately, her BR is relatively unchanged from when she started steroids, and her 7 day Lille score is 0.762, indicating she is not a responder to steroids. She had a mild NOAM this past admission, with responded to fluids.     Visit today is a telephone encounter with the patient and her , unable to examine her. They are aware she is not a liver transplant candidate currently as she has had several admissions for alcohol related medical problems. She would need to engage in alcohol counseling and have > 6 months sobriety given this. Discussed that she is very sick with severe alcoholic hepatitis, needs to completely abstain from alcohol or is at high risk of death.     -  "Recommend stopping prednisolone given BR did not improve with treatment and high Lille Score. Hope is that her BR will improve with longer sobriety, but she is still at high risk for complications or death given her severe alcoholic hepatitis  - Would also recommend stopping diuretics as she only have trace ascites on RUQ US and recent NOAM. She was also on diuretics for LE edema, she states this has improved as well. She also was not given her spironolactone the morning of our interview 2/2 hypotension  - SW referral for alcohol counseling. Discussed with the patient and her  that she is at high risk of death if she returns to drinking, that complete sobriety with engagement in alcohol treatment would be required if she were to be considered for a liver transplant in the future  - Discussed importance of nutrition with high protein foods for alcoholic hepatitis  - Discussed that she is at high risk for further complications - they should bring her to the ED if she has AMS, signs/smyptoms of infection, abdominal distention, severe LE edema  - Continue lactulose and rifaximin    RTC this Friday for in person visit    Tari Moser MD MSc  Hepatology/Liver Transplant    Approximately 31 minutes of non face-to-face time were spent in review of the patient's medical record to date.  This included review of previous: clinic visits, hospital records, lab results, imaging studies, and procedural documentation.  The findings from this review are summarized in the above note.      Caroline Barajas is a 55 year old female who is being evaluated via a billable telephone visit during the COVID-19 pandemic and clinic response to limit in-person visits.  The patient has been notified of following:   \"This telephone visit will be conducted via a call between you and your physician/provider. We have found that certain health care needs can be provided without the need for a physical exam.  This service lets us provide the care " "you need with a short phone conversation.  If a prescription is necessary we can send it directly to your pharmacy.  If lab work is needed we can place an order for that and you can then stop by our lab to have the test done at a later time.  If during the course of the call the physician/provider feels a telephone visit is not appropriate, you will not be charged for this service.\" Telephone visits are billed at different rates depending on your insurance coverage. During this emergency period, for some insurers they may be billed the same as an in-person visit.  Please reach out to your insurance provider with any questions.  Consent has been obtained for this service by 1 care team member: yes  I have reviewed and updated the patient's Past Medical History, Social History, Family History and Medication List.    What phone number would you like to be contacted at?     Phone call contact time  Call Started at 10:30  Call Ended at 11:15  On phone patient and         Again, thank you for allowing me to participate in the care of your patient.        Sincerely,        Tari Moser MD    "

## 2020-11-03 NOTE — PROGRESS NOTES
"[facility] New Patient Visit    Date of Visit: November 2, 2020    Reason for referral: Severe alcoholic hepatitis    Subjective: Ms. Barajas is a 55 year old woman with a history of alcohol use disorder, admissions for alcoholic hepatitis 1/2017 and 11/2017, who presents for follow up severe alcoholic hepatitis.     Unable to do video visit 2/2 technical difficulties, converted to phone visit. History obtained from the patient and her .     1/2017: Hospitalized with jaundice, BR peaked at 7. She was able to remain sober for a few months but returned to drinking. CT showed fatty liver this admission.  11/2017: Hospitalized with jaundice, BR peaked at 13. Was sober for a few months after this but returned to drinking. Also thought to have cholecystitis. She had RUQ US that was c/w acute cholecystitis, dilated CBD. MRCP showed a steatotic liver but otherwise normal GB. CBD 8 mm.She underwent surgery for this - ANGELINA, CCY, and intraoperative cholangiogram that was negative. She had gallstones but gallbladder appeared grossly normal. She stopped drink for a few months after this.     She reports completing inpatient alcohol treatment prior to these admissions because of an \"incident\". She has not done any alcohol treatment since her first episode of AH 1/2017. No history of DUIs or trouble with the law related to drinking     Admitted to the hospital 10/10/2020 when she was found to be non responsive by her , taken to the ambulance. She had been declining the days prior to this, her  was bringing her food and helping with the bathroom. He didn't noticed how jaundiced she had been until the ambulance brought her outside. She states she was slowly declining and stopped drinking because she was feeling poorly. Last drink was the end of September 2020.     She was admitted to [place] 10/10-16 for jaundice and AMS. DF was 65 on admission (BR 29.1), so she was started on steroids " that were later stopped 2/2 UTI. She had a RUQ that showed hepatic steatosis with trace ascites. No biliary dilation. She had a head CT on admission that was negative. She was discharged to a TCU 10/17, but developed slight NOAM (creatinine max 1.45) that improved with albumin and midodrine, so was transferred back to the hospital. On 10/23, she was restarted on steroids given a repeat UA was negative. BR was 28.7 on 10/23. Discharged back to TCU 10/26. 10/30 BR was 27.3 with Lille score of 0.762. Creatinine was normal on discharge. She did not have a para this admission and only had trace ascites on imaging.     There is documentation that she was seen by CD in consultation with recommendation to contact local JUSTINE facilities, and was given a resource list for this. She does not recall receiving this information, and has not set anything up yet. She was just discharge from her TCU this AM. She states she wants to stop drinking.     She states her spironolactone was held the AM was discharge 2/2 hypotension, but she received lasix 20 mg. She is prescribed lasix 20, pk 50. She is not having significant abdominal swelling or LE swelling. She is also on rifaximin and lactulose. Is taking midodrine.     Patient and her  were told she is not a transplant candidate currently.     Still working on getting strength, getting home PT.     ROS: 14 point ROS negative except for positives noted in HPI.    PMHx:  Past Medical History:   Diagnosis Date     BACKACHE NOS 1/19/2007     EDEMA 3/26/2008     INSOMNIA NEC 1/19/2007     LUMBAR DISC DISPLACEMENT 2/13/2007     LUMBOSACRAL NEURITIS NOS 2/13/2007     OPIOID DEPENDENCE-CONTIN 1/19/2007     PRIMARY HYPERCOAGULABLE STATE 3/26/2008    HOMOZYGOUS FOR FACTOR V LEIDEN MUTATION PER PT REPORT   Alcoholic hepatitis x3    PSHx:  Gastric bypass surgery  CCY     FamHx:  Family History   Problem Relation Age of Onset     Blood Disease Sister         Factor V Leiden mutation and hx  DVT       SocHx:  Social History     Socioeconomic History     Marital status:      Spouse name: Not on file     Number of children: Not on file     Years of education: Not on file     Highest education level: Not on file   Occupational History     Not on file   Social Needs     Financial resource strain: Not on file     Food insecurity     Worry: Not on file     Inability: Not on file     Transportation needs     Medical: Not on file     Non-medical: Not on file   Tobacco Use     Smoking status: Never Smoker     Smokeless tobacco: Never Used   Substance and Sexual Activity     Alcohol use: Not Currently     Comment: Last drink 9/30/2020     Drug use: Not Currently     Comment: past marijuana use     Sexual activity: Not on file   Lifestyle     Physical activity     Days per week: Not on file     Minutes per session: Not on file     Stress: Not on file   Relationships     Social connections     Talks on phone: Not on file     Gets together: Not on file     Attends Anabaptist service: Not on file     Active member of club or organization: Not on file     Attends meetings of clubs or organizations: Not on file     Relationship status: Not on file     Intimate partner violence     Fear of current or ex partner: Not on file     Emotionally abused: Not on file     Physically abused: Not on file     Forced sexual activity: Not on file   Other Topics Concern     Not on file   Social History Narrative     Not on file   Previously working, used to work as a  stopped > 10 years ago, stopped as she was caring for her two kids  Lives at home with  and daughter (20). Has a son as well who lives in [place].     Medications:  Current Outpatient Medications   Medication     calcium carbonate (TUMS) 500 MG chewable tablet     CONSTULOSE 10 GM/15ML solution     ELIQUIS ANTICOAGULANT 2.5 MG tablet     furosemide (LASIX) 20 MG tablet     levothyroxine (SYNTHROID/LEVOTHROID) 88 MCG tablet     melatonin 3 MG  tablet     midodrine (PROAMATINE) 10 MG tablet     pantoprazole (PROTONIX) 40 MG EC tablet     prednisoLONE (PRELONE) 15 MG/5ML syrup     promethazine (PHENERGAN) 25 MG tablet     QUEtiapine (SEROQUEL) 300 MG tablet     rifaximin (XIFAXAN) 550 MG TABS tablet     spironolactone (ALDACTONE) 50 MG tablet     No current facility-administered medications for this visit.    She is not taking the eliquis currently    Allergies:  No Known Allergies    Objective:  No vitals or exam for this telephone visit    Labs:  11/2/2020  Na 141  K 3.7  Creatinine 0.91  Albumin 2.7      TBR 29.3   Alk phos 141  INR 1.6    MELD Na 24    Imaging:    CT 1/2017    IMPRESSION:    1.  Pronounced hepatic fatty infiltration with hepatomegaly as on the   comparison September study.  Decrease in the right retroperitoneal   hemorrhage with a relatively small collection remaining measuring   approximately 3 x 5 cm in transverse dimension.    2.  Cholelithiasis.     CT 10/11/2020        IMPRESSION:    1.  Severe hepatic steatosis.  No focal hepatic lesions.        2.  Diffusely thickened partially visualized colonic wall.  Findings are    consistent with colitis, favor inflammatory etiology.      RUQ US 10/23/2020    FINDINGS:    Included portions of the pancreas has normal appearance. Diffuse increased echogenicity with   attenuation of sound involving the liver. No discrete mass. Common bile duct measures 6 mm in   diameter. Right kidney is 11.8 cm in length without collecting system dilation. Trace volume   ascites.  No gallbladder identified.    IMPRESSION:  1.  Hepatic steatosis without hepatic mass.  2.  Trace ascites.    Endoscopy:    EGD 10/15/2020    Findings:       The examined esophagus was normal. No varices       Evidence of a gastric bypass was found. A gastric pouch with a small        size was found. Unable to retroflex. The staple line appeared intact. A        stitch with granulation tissue noted and a couple of  traction        diverticulae noted. Tiny ulcer by GJ anastomosis noted, clean based.       The examined jejunum was normal.    Impression:            - Normal esophagus.                         - Gastric bypass with a small-sized pouch and intact                          staple line. Tiny ulcer by GJ anastomosis noted, clean                          based.                         - Normal examined jejunum.                         - No specimens collected.      Assessment/Plan:  Ms. Barajas is a 55 year old woman with a history of alcohol use disorder, admissions for alcoholic hepatitis 1/2017 and 11/2017, who presents for follow up severe alcoholic hepatitis. She also has a history of obesity s/p RNYGB.     She presented with a third episode of alcoholic hepatitis 10/2020. Last drink 9/2020. She have severe alcoholic hepatitis, was started on steroids. Steroids were stopped briefly for a UTI, but then restarted 10/23. Unfortunately, her BR is relatively unchanged from when she started steroids, and her 7 day Lille score is 0.762, indicating she is not a responder to steroids. She had a mild NOAM this past admission, with responded to fluids.     Visit today is a telephone encounter with the patient and her , unable to examine her. They are aware she is not a liver transplant candidate currently as she has had several admissions for alcohol related medical problems. She would need to engage in alcohol counseling and have > 6 months sobriety given this. Discussed that she is very sick with severe alcoholic hepatitis, needs to completely abstain from alcohol or is at high risk of death.     - Recommend stopping prednisolone given BR did not improve with treatment and high Lille Score. Hope is that her BR will improve with longer sobriety, but she is still at high risk for complications or death given her severe alcoholic hepatitis  - Would also recommend stopping diuretics as she only have trace ascites on RUQ US  and recent NOAM. She was also on diuretics for LE edema, she states this has improved as well. She also was not given her spironolactone the morning of our interview 2/2 hypotension  - SW referral for alcohol counseling. Discussed with the patient and her  that she is at high risk of death if she returns to drinking, that complete sobriety with engagement in alcohol treatment would be required if she were to be considered for a liver transplant in the future  - Discussed importance of nutrition with high protein foods for alcoholic hepatitis  - Discussed that she is at high risk for further complications - they should bring her to the ED if she has AMS, signs/smyptoms of infection, abdominal distention, severe LE edema  - Continue lactulose and rifaximin    RTC this Friday for in person visit    Tari Moser MD MSc  Hepatology/Liver Transplant    Approximately 31 minutes of non face-to-face time were spent in review of the patient's medical record to date.  This included review of previous: clinic visits, hospital records, lab results, imaging studies, and procedural documentation.  The findings from this review are summarized in the above note.

## 2020-11-06 NOTE — PROGRESS NOTES
"[facility] Return Visit     Date of Visit: November 6, 2020     Reason for referral: Severe alcoholic hepatitis     Subjective: Ms. Barajas is a 55 year old woman with a history of alcohol use disorder, admissions for alcoholic hepatitis 1/2017 and 11/2017, who presents for follow up severe alcoholic hepatitis.     She is joined by her  today.      Initial History:     1/2017: Hospitalized with jaundice, BR peaked at 7. She was able to remain sober for a few months but returned to drinking. CT showed fatty liver this admission.  11/2017: Hospitalized with jaundice, BR peaked at 13. Was sober for a few months after this but returned to drinking. Also thought to have cholecystitis. She had RUQ US that was c/w acute cholecystitis, dilated CBD. MRCP showed a steatotic liver but otherwise normal GB. CBD 8 mm.She underwent surgery for this - ANGELINA, YENNI, and intraoperative cholangiogram that was negative. She had gallstones but gallbladder appeared grossly normal. She stopped drinking for a few months after this.      She reports completing inpatient alcohol treatment prior to these admissions because of an \"incident\". She has not done any alcohol treatment since her first episode of AH 1/2017. No history of DUIs or trouble with the law related to drinking.     Admitted to the hospital 10/10/2020 when she was found to be non responsive by her , taken to the ambulance. She had been declining the days prior to this, her  was bringing her food and helping with the bathroom. He didn't noticed how jaundiced she had been until the ambulance brought her outside. She states she was slowly declining and stopped drinking because she was feeling poorly. Last drink was the end of September 2020.      She was admitted to [facility] 10/10-16 for jaundice and AMS. DF was 65 on admission (BR 29.1), so she was started on steroids that were later stopped 2/2 UTI. She had a RUQ that showed hepatic " "steatosis with trace ascites. No biliary dilation. She had a head CT on admission that was negative. She was discharged to a TCU 10/17, but developed slight NOAM (creatinine max 1.45) that improved with albumin and midodrine, so was transferred back to the hospital. On 10/23, she was restarted on steroids given a repeat UA was negative. BR was 28.7 on 10/23. Discharged back to TCU 10/26. 10/30 BR was 27.3 with Lille score of 0.762. Creatinine was normal on discharge. She did not have a para this admission and only had trace ascites on imaging.      Intervals Events:  - Has been home a few days, is weak but about the same. Home PT about to come soon. Trying to improve nutrition, drinking chocolate ensure but doesn't have much of an appetite  - Stopped diuretics on Tuesday, thinks LE swelling is actually better  - No fevers or chills  - Taking lactulose once a day, having several BMs with this. Has not received rifaximin yet, needs a PA. Feels like she is in a \"fog\" and is getting dizzy when getting up from standing.   -  looking into alcohol treatment options  - Back on eliquis  -  working from home so he is able to care for her    ROS: 14 point ROS negative except for positives noted in HPI.     PMHx:  Past Medical History:   Diagnosis Date     BACKACHE NOS 1/19/2007     EDEMA 3/26/2008     INSOMNIA NEC 1/19/2007     LUMBAR DISC DISPLACEMENT 2/13/2007     LUMBOSACRAL NEURITIS NOS 2/13/2007     OPIOID DEPENDENCE-CONTIN 1/19/2007     PRIMARY HYPERCOAGULABLE STATE 3/26/2008    HOMOZYGOUS FOR FACTOR V LEIDEN MUTATION PER PT REPORT   Alcoholic hepatitis x3     PSHx:  Gastric bypass surgery  CCY      FamHx:  Family History   Problem Relation Age of Onset     Blood Disease Sister         Factor V Leiden mutation and hx DVT     SocHx:  Social History     Socioeconomic History     Marital status:      Spouse name: Not on file     Number of children: Not on file     Years of education: Not on file     " Highest education level: Not on file   Occupational History     Not on file   Social Needs     Financial resource strain: Not on file     Food insecurity     Worry: Not on file     Inability: Not on file     Transportation needs     Medical: Not on file     Non-medical: Not on file   Tobacco Use     Smoking status: Never Smoker     Smokeless tobacco: Never Used   Substance and Sexual Activity     Alcohol use: Not Currently     Comment: Last drink 9/30/2020     Drug use: Not Currently     Comment: past marijuana use     Sexual activity: Not on file   Lifestyle     Physical activity     Days per week: Not on file     Minutes per session: Not on file     Stress: Not on file   Relationships     Social connections     Talks on phone: Not on file     Gets together: Not on file     Attends [employer] service: Not on file     Active member of club or organization: Not on file     Attends meetings of clubs or organizations: Not on file     Relationship status: Not on file     Intimate partner violence     Fear of current or ex partner: Not on file     Emotionally abused: Not on file     Physically abused: Not on file     Forced sexual activity: Not on file   Other Topics Concern     Not on file   Social History Narrative     Not on file   Previously working, used to work as a  stopped > 10 years ago, stopped as she was caring for her two kids  Lives at home with  and daughter (20). Has a son as well who lives in [place].      Medications:  Current Outpatient Medications   Medication     calcium carbonate (TUMS) 500 MG chewable tablet     CONSTULOSE 10 GM/15ML solution     ELIQUIS ANTICOAGULANT 2.5 MG tablet     levothyroxine (SYNTHROID/LEVOTHROID) 88 MCG tablet     melatonin 3 MG tablet     midodrine (PROAMATINE) 10 MG tablet     pantoprazole (PROTONIX) 40 MG EC tablet     promethazine (PHENERGAN) 25 MG tablet     QUEtiapine (SEROQUEL) 300 MG tablet     rifaximin (XIFAXAN) 550 MG TABS tablet     No  "current facility-administered medications for this visit.      Allergies:  No Known Allergies     Objective:  /70   Pulse 94   Temp 97.6  F (36.4  C) (Oral)   Ht 1.689 m (5' 6.5\")   Wt 80.7 kg (177 lb 14.4 oz)   SpO2 100%   BMI 28.28 kg/m    General: Jaundiced woman in mild distress  HEENT: Icteric  Resp: Breathing comfortably on RA  Abd: Soft, NT, minimal ascites  Ext: Trace LE edema    Labs:  11/2/2020  Na 141  K 3.7  Creatinine 0.91  Albumin 2.7      TBR 29.3   Alk phos 141  INR 1.6     MELD Na 24    Last Comprehensive Metabolic Panel:  Sodium   Date Value Ref Range Status   11/06/2020 136 133 - 144 mmol/L Final     Potassium   Date Value Ref Range Status   11/06/2020 3.5 3.4 - 5.3 mmol/L Final     Chloride   Date Value Ref Range Status   11/06/2020 103 94 - 109 mmol/L Final     Carbon Dioxide   Date Value Ref Range Status   11/06/2020 21 20 - 32 mmol/L Final     Anion Gap   Date Value Ref Range Status   11/06/2020 11 3 - 14 mmol/L Final     Glucose   Date Value Ref Range Status   11/06/2020 139 (H) 70 - 99 mg/dL Final     Urea Nitrogen   Date Value Ref Range Status   11/06/2020 17 7 - 30 mg/dL Final     Creatinine   Date Value Ref Range Status   11/06/2020 1.02 0.52 - 1.04 mg/dL Final     GFR Estimate   Date Value Ref Range Status   11/06/2020 62 >60 mL/min/[1.73_m2] Final     Comment:     Non  GFR Calc  Starting 12/18/2018, serum creatinine based estimated GFR (eGFR) will be   calculated using the Chronic Kidney Disease Epidemiology Collaboration   (CKD-EPI) equation.       Calcium   Date Value Ref Range Status   11/06/2020 8.8 8.5 - 10.1 mg/dL Final     Bilirubin Total   Date Value Ref Range Status   11/06/2020 30.2 (HH) 0.2 - 1.3 mg/dL Final     Comment:     Critical Value called to and read back by  LATRELL HUGHES MD [id number] 11/6/20 BY AY       Alkaline Phosphatase   Date Value Ref Range Status   11/06/2020 216 (H) 40 - 150 U/L Final     ALT   Date Value Ref Range Status "   11/06/2020 236 (H) 0 - 50 U/L Final     AST   Date Value Ref Range Status   11/06/2020 268 (H) 0 - 45 U/L Final       Lab Results   Component Value Date    WBC 24.5 11/06/2020     Lab Results   Component Value Date    RBC 2.77 11/06/2020     Lab Results   Component Value Date    HGB 9.8 11/06/2020     Lab Results   Component Value Date    HCT 30.6 11/06/2020     No components found for: MCT  Lab Results   Component Value Date     11/06/2020     Lab Results   Component Value Date    MCH 35.4 11/06/2020     Lab Results   Component Value Date    MCHC 32.0 11/06/2020     Lab Results   Component Value Date    RDW 19.3 11/06/2020     Lab Results   Component Value Date     11/06/2020     MELD-Na score: 27 at 11/6/2020 11:59 AM  MELD score: 27 at 11/6/2020 11:59 AM  Calculated from:  Serum Creatinine: 1.02 mg/dL at 11/6/2020 11:59 AM  Serum Sodium: 136 mmol/L at 11/6/2020 11:59 AM  Total Bilirubin: 30.2 mg/dL at 11/6/2020 11:59 AM  INR(ratio): 1.87 at 11/6/2020 11:59 AM  Age: 55 years 7 months     Imaging:     CT 1/2017    IMPRESSION:    1.  Pronounced hepatic fatty infiltration with hepatomegaly as on the   comparison September study.  Decrease in the right retroperitoneal   hemorrhage with a relatively small collection remaining measuring   approximately 3 x 5 cm in transverse dimension.    2.  Cholelithiasis.      CT 10/11/2020         IMPRESSION:    1.  Severe hepatic steatosis.  No focal hepatic lesions.        2.  Diffusely thickened partially visualized colonic wall.  Findings are    consistent with colitis, favor inflammatory etiology.       RUQ US 10/23/2020     FINDINGS:    Included portions of the pancreas has normal appearance. Diffuse increased echogenicity with   attenuation of sound involving the liver. No discrete mass. Common bile duct measures 6 mm in   diameter. Right kidney is 11.8 cm in length without collecting system dilation. Trace volume   ascites.  No gallbladder  identified.    IMPRESSION:  1.  Hepatic steatosis without hepatic mass.  2.  Trace ascites.     Endoscopy:     EGD 10/15/2020     Findings:       The examined esophagus was normal. No varices       Evidence of a gastric bypass was found. A gastric pouch with a small        size was found. Unable to retroflex. The staple line appeared intact. A        stitch with granulation tissue noted and a couple of traction        diverticulae noted. Tiny ulcer by GJ anastomosis noted, clean based.       The examined jejunum was normal.    Impression:            - Normal esophagus.                         - Gastric bypass with a small-sized pouch and intact                          staple line. Tiny ulcer by GJ anastomosis noted, clean                          based.                         - Normal examined jejunum.                         - No specimens collected.       Assessment/Plan:  Ms. Barajas is a 55 year old woman with a history of alcohol use disorder, admissions for alcoholic hepatitis 1/2017 and 11/2017, who presents for follow up severe alcoholic hepatitis. She also has a history of obesity s/p RNYGB.      She presented with a third episode of alcoholic hepatitis 10/2020. Last drink 9/2020. She have severe alcoholic hepatitis, was started on steroids. Steroids were stopped briefly for a UTI, but then restarted 10/23. Unfortunately, her BR is relatively unchanged from when she started steroids, and her 7 day Lille score is 0.762, indicating she is not a responder to steroids. She had a mild NOAM this past admission, with responded to fluids. She is still on midodrine for this.     Stopped her diuretics on 11/3, she has not had recurrent LE swelling with this.     She is overall very weak, awaiting home PT. Feels she is in a fog, taking lactulose. Has not received rifaximin yet.     The patient and her  are aware she is not a candidate for early transplantation (< 6 months) given her last drink was the end of  9/2020 and she has been hospitalized for alcoholic hepatitis twice and returned to drinking. Discussed that she needs to engage in alcohol treatment to be considered for transplant in the future. Discussed the natural history of severe alcoholic hepatitis. She has failed steroid treatment, the hope is that her bilirubin will improve with further sobriety. I would still expect her to have cirrhosis give the majority of patients with severe alcoholic hepatitis have underlying cirrhosis. Her nutrition and functional status are very important pieces in her recovery.     Labs after today's visit show a new WBC count of 25. Last WBC was 6/5 on 10/26 - while she was taking steroids. She denied F/C, was AF in clinic. Patients with severe alcoholic hepatitis can have a marked leukocytosis, but would not attribute it to that this later in her course especially after her WBC previously normalized.     - Recommend presentation to ED with likely admission for infectious rule out (paracentesis, blood cultures, UA/Cx, CXR) given rising WBC count. LFTs are elevated but stable from earlier this week  - Continue to hold diuretics   - Continue lactulose, will start on PA for her rifaximin  - Referral to RN case management for complex care  - She should get weekly labs (CMP, CBC, INR) in the future - she gets [facility]  - Social work referral placed for alcohol treatment resources. Discussed with the patient and her  that she is at high risk of death if she returns to drinking, that complete sobriety with engagement in alcohol treatment would be required if she were to be considered for a liver transplant in the future  - Dietician referral to help with nutrition  - Would recommend stopping eliquis given she severe liver disease (CP C) and higher risks of bleeding. Discussed that her elevated INR 2/2 liver disease does not reflect true coagulopathy given there is a balance if loss of pro and anti  coagulant factors in liver disease. She has been seen in hematology here in the past, who recommended life long AC. Will message her previous hematologist that she saw in 2018 with the question of safety of anticoagulation for her     RTC 1 month, will also enroll in complex care management     Tari Moser MD MSc  Hepatology/Liver Transplant

## 2020-11-06 NOTE — LETTER
"    11/6/2020         RE: Caroline Barajas  [address] [facility] [address]        Dear Colleague,    Thank you for referring your patient, Caroline Barajas, to the [facility] [place]. Please see a copy of my visit note below.    [facility] Return Visit     Date of Visit: November 6, 2020     Reason for referral: Severe alcoholic hepatitis     Subjective: Ms. Barajas is a 55 year old woman with a history of alcohol use disorder, admissions for alcoholic hepatitis 1/2017 and 11/2017, who presents for follow up severe alcoholic hepatitis.     She is joined by her  today.      Initial History:     1/2017: Hospitalized with jaundice, BR peaked at 7. She was able to remain sober for a few months but returned to drinking. CT showed fatty liver this admission.  11/2017: Hospitalized with jaundice, BR peaked at 13. Was sober for a few months after this but returned to drinking. Also thought to have cholecystitis. She had RUQ US that was c/w acute cholecystitis, dilated CBD. MRCP showed a steatotic liver but otherwise normal GB. CBD 8 mm.She underwent surgery for this - ANGELINA, YENNI, and intraoperative cholangiogram that was negative. She had gallstones but gallbladder appeared grossly normal. She stopped drinking for a few months after this.      She reports completing inpatient alcohol treatment prior to these admissions because of an \"incident\". She has not done any alcohol treatment since her first episode of AH 1/2017. No history of DUIs or trouble with the law related to drinking.     Admitted to the hospital 10/10/2020 when she was found to be non responsive by her , taken to the ambulance. She had been declining the days prior to this, her  was bringing her food and helping with the bathroom. He didn't noticed how jaundiced she had been until the ambulance brought her outside. She states she was slowly declining and stopped drinking because " "she was feeling poorly. Last drink was the end of September 2020.      She was admitted to [facility] 10/10-16 for jaundice and AMS. DF was 65 on admission (BR 29.1), so she was started on steroids that were later stopped 2/2 UTI. She had a RUQ that showed hepatic steatosis with trace ascites. No biliary dilation. She had a head CT on admission that was negative. She was discharged to a TCU 10/17, but developed slight NOAM (creatinine max 1.45) that improved with albumin and midodrine, so was transferred back to the hospital. On 10/23, she was restarted on steroids given a repeat UA was negative. BR was 28.7 on 10/23. Discharged back to TCU 10/26. 10/30 BR was 27.3 with Lille score of 0.762. Creatinine was normal on discharge. She did not have a para this admission and only had trace ascites on imaging.      Intervals Events:  - Has been home a few days, is weak but about the same. Home PT about to come soon. Trying to improve nutrition, drinking chocolate ensure but doesn't have much of an appetite  - Stopped diuretics on Tuesday, thinks LE swelling is actually better  - No fevers or chills  - Taking lactulose once a day, having several BMs with this. Has not received rifaximin yet, needs a PA. Feels like she is in a \"fog\" and is getting dizzy when getting up from standing.   -  looking into alcohol treatment options  - Back on eliquis  -  working from home so he is able to care for her    ROS: 14 point ROS negative except for positives noted in HPI.     PMHx:  Past Medical History:   Diagnosis Date     BACKACHE NOS 1/19/2007     EDEMA 3/26/2008     INSOMNIA NEC 1/19/2007     LUMBAR DISC DISPLACEMENT 2/13/2007     LUMBOSACRAL NEURITIS NOS 2/13/2007     OPIOID DEPENDENCE-CONTIN 1/19/2007     PRIMARY HYPERCOAGULABLE STATE 3/26/2008    HOMOZYGOUS FOR FACTOR V LEIDEN MUTATION PER PT REPORT   Alcoholic hepatitis x3     PSHx:  Gastric bypass surgery  CCY      FamHx:  Family History   Problem Relation Age of " Onset     Blood Disease Sister         Factor V Leiden mutation and hx DVT     SocHx:  Social History     Socioeconomic History     Marital status:      Spouse name: Not on file     Number of children: Not on file     Years of education: Not on file     Highest education level: Not on file   Occupational History     Not on file   Social Needs     Financial resource strain: Not on file     Food insecurity     Worry: Not on file     Inability: Not on file     Transportation needs     Medical: Not on file     Non-medical: Not on file   Tobacco Use     Smoking status: Never Smoker     Smokeless tobacco: Never Used   Substance and Sexual Activity     Alcohol use: Not Currently     Comment: Last drink 9/30/2020     Drug use: Not Currently     Comment: past marijuana use     Sexual activity: Not on file   Lifestyle     Physical activity     Days per week: Not on file     Minutes per session: Not on file     Stress: Not on file   Relationships     Social connections     Talks on phone: Not on file     Gets together: Not on file     Attends Confucianist service: Not on file     Active member of club or organization: Not on file     Attends meetings of clubs or organizations: Not on file     Relationship status: Not on file     Intimate partner violence     Fear of current or ex partner: Not on file     Emotionally abused: Not on file     Physically abused: Not on file     Forced sexual activity: Not on file   Other Topics Concern     Not on file   Social History Narrative     Not on file   Previously working, used to work as a  stopped > 10 years ago, stopped as she was caring for her two kids  Lives at home with  and daughter (20). Has a son as well who lives in [place].      Medications:  Current Outpatient Medications   Medication     calcium carbonate (TUMS) 500 MG chewable tablet     CONSTULOSE 10 GM/15ML solution     ELIQUIS ANTICOAGULANT 2.5 MG tablet     levothyroxine (SYNTHROID/LEVOTHROID)  "88 MCG tablet     melatonin 3 MG tablet     midodrine (PROAMATINE) 10 MG tablet     pantoprazole (PROTONIX) 40 MG EC tablet     promethazine (PHENERGAN) 25 MG tablet     QUEtiapine (SEROQUEL) 300 MG tablet     rifaximin (XIFAXAN) 550 MG TABS tablet     No current facility-administered medications for this visit.      Allergies:  No Known Allergies     Objective:  /70   Pulse 94   Temp 97.6  F (36.4  C) (Oral)   Ht 1.689 m (5' 6.5\")   Wt 80.7 kg (177 lb 14.4 oz)   SpO2 100%   BMI 28.28 kg/m    General: Jaundiced woman in mild distress  HEENT: Icteric  Resp: Breathing comfortably on RA  Abd: Soft, NT, minimal ascites  Ext: Trace LE edema    Labs:  11/2/2020  Na 141  K 3.7  Creatinine 0.91  Albumin 2.7      TBR 29.3   Alk phos 141  INR 1.6     MELD Na 24    Last Comprehensive Metabolic Panel:  Sodium   Date Value Ref Range Status   11/06/2020 136 133 - 144 mmol/L Final     Potassium   Date Value Ref Range Status   11/06/2020 3.5 3.4 - 5.3 mmol/L Final     Chloride   Date Value Ref Range Status   11/06/2020 103 94 - 109 mmol/L Final     Carbon Dioxide   Date Value Ref Range Status   11/06/2020 21 20 - 32 mmol/L Final     Anion Gap   Date Value Ref Range Status   11/06/2020 11 3 - 14 mmol/L Final     Glucose   Date Value Ref Range Status   11/06/2020 139 (H) 70 - 99 mg/dL Final     Urea Nitrogen   Date Value Ref Range Status   11/06/2020 17 7 - 30 mg/dL Final     Creatinine   Date Value Ref Range Status   11/06/2020 1.02 0.52 - 1.04 mg/dL Final     GFR Estimate   Date Value Ref Range Status   11/06/2020 62 >60 mL/min/[1.73_m2] Final     Comment:     Non  GFR Calc  Starting 12/18/2018, serum creatinine based estimated GFR (eGFR) will be   calculated using the Chronic Kidney Disease Epidemiology Collaboration   (CKD-EPI) equation.       Calcium   Date Value Ref Range Status   11/06/2020 8.8 8.5 - 10.1 mg/dL Final     Bilirubin Total   Date Value Ref Range Status   11/06/2020 30.2 " (HH) 0.2 - 1.3 mg/dL Final     Comment:     Critical Value called to and read back by  LATRELL HUGHES MD [id number] 11/6/20 BY AY       Alkaline Phosphatase   Date Value Ref Range Status   11/06/2020 216 (H) 40 - 150 U/L Final     ALT   Date Value Ref Range Status   11/06/2020 236 (H) 0 - 50 U/L Final     AST   Date Value Ref Range Status   11/06/2020 268 (H) 0 - 45 U/L Final       Lab Results   Component Value Date    WBC 24.5 11/06/2020     Lab Results   Component Value Date    RBC 2.77 11/06/2020     Lab Results   Component Value Date    HGB 9.8 11/06/2020     Lab Results   Component Value Date    HCT 30.6 11/06/2020     No components found for: MCT  Lab Results   Component Value Date     11/06/2020     Lab Results   Component Value Date    MCH 35.4 11/06/2020     Lab Results   Component Value Date    MCHC 32.0 11/06/2020     Lab Results   Component Value Date    RDW 19.3 11/06/2020     Lab Results   Component Value Date     11/06/2020     MELD-Na score: 27 at 11/6/2020 11:59 AM  MELD score: 27 at 11/6/2020 11:59 AM  Calculated from:  Serum Creatinine: 1.02 mg/dL at 11/6/2020 11:59 AM  Serum Sodium: 136 mmol/L at 11/6/2020 11:59 AM  Total Bilirubin: 30.2 mg/dL at 11/6/2020 11:59 AM  INR(ratio): 1.87 at 11/6/2020 11:59 AM  Age: 55 years 7 months     Imaging:     CT 1/2017    IMPRESSION:    1.  Pronounced hepatic fatty infiltration with hepatomegaly as on the   comparison September study.  Decrease in the right retroperitoneal   hemorrhage with a relatively small collection remaining measuring   approximately 3 x 5 cm in transverse dimension.    2.  Cholelithiasis.      CT 10/11/2020         IMPRESSION:    1.  Severe hepatic steatosis.  No focal hepatic lesions.        2.  Diffusely thickened partially visualized colonic wall.  Findings are    consistent with colitis, favor inflammatory etiology.       RUQ US 10/23/2020     FINDINGS:    Included portions of the pancreas has normal appearance. Diffuse  increased echogenicity with   attenuation of sound involving the liver. No discrete mass. Common bile duct measures 6 mm in   diameter. Right kidney is 11.8 cm in length without collecting system dilation. Trace volume   ascites.  No gallbladder identified.    IMPRESSION:  1.  Hepatic steatosis without hepatic mass.  2.  Trace ascites.     Endoscopy:     EGD 10/15/2020     Findings:       The examined esophagus was normal. No varices       Evidence of a gastric bypass was found. A gastric pouch with a small        size was found. Unable to retroflex. The staple line appeared intact. A        stitch with granulation tissue noted and a couple of traction        diverticulae noted. Tiny ulcer by GJ anastomosis noted, clean based.       The examined jejunum was normal.    Impression:            - Normal esophagus.                         - Gastric bypass with a small-sized pouch and intact                          staple line. Tiny ulcer by GJ anastomosis noted, clean                          based.                         - Normal examined jejunum.                         - No specimens collected.       Assessment/Plan:  Ms. Barajas is a 55 year old woman with a history of alcohol use disorder, admissions for alcoholic hepatitis 1/2017 and 11/2017, who presents for follow up severe alcoholic hepatitis. She also has a history of obesity s/p RNYGB.      She presented with a third episode of alcoholic hepatitis 10/2020. Last drink 9/2020. She have severe alcoholic hepatitis, was started on steroids. Steroids were stopped briefly for a UTI, but then restarted 10/23. Unfortunately, her BR is relatively unchanged from when she started steroids, and her 7 day Lille score is 0.762, indicating she is not a responder to steroids. She had a mild NOAM this past admission, with responded to fluids. She is still on midodrine for this.     Stopped her diuretics on 11/3, she has not had recurrent LE swelling with this.     She is  overall very weak, awaiting home PT. Feels she is in a fog, taking lactulose. Has not received rifaximin yet.     The patient and her  are aware she is not a candidate for early transplantation (< 6 months) given her last drink was the end of 9/2020 and she has been hospitalized for alcoholic hepatitis twice and returned to drinking. Discussed that she needs to engage in alcohol treatment to be considered for transplant in the future. Discussed the natural history of severe alcoholic hepatitis. She has failed steroid treatment, the hope is that her bilirubin will improve with further sobriety. I would still expect her to have cirrhosis give the majority of patients with severe alcoholic hepatitis have underlying cirrhosis. Her nutrition and functional status are very important pieces in her recovery.     Labs after today's visit show a new WBC count of 25. Last WBC was 6/5 on 10/26 - while she was taking steroids. She denied F/C, was AF in clinic. Patients with severe alcoholic hepatitis can have a marked leukocytosis, but would not attribute it to that this later in her course especially after her WBC previously normalized.     - Recommend presentation to ED with likely admission for infectious rule out (paracentesis, blood cultures, UA/Cx, CXR) given rising WBC count. LFTs are elevated but stable from earlier this week  - Continue to hold diuretics   - Continue lactulose, will start on PA for her rifaximin  - Referral to RN case management for complex care  - She should get weekly labs (CMP, CBC, INR) in the future - she gets labs through the [facility] system  - Social work referral placed for alcohol treatment resources. Discussed with the patient and her  that she is at high risk of death if she returns to drinking, that complete sobriety with engagement in alcohol treatment would be required if she were to be considered for a liver transplant in the future  - Dietician referral to help with  nutrition  - Would recommend stopping eliquis given she severe liver disease (CP C) and higher risks of bleeding. Discussed that her elevated INR 2/2 liver disease does not reflect true coagulopathy given there is a balance if loss of pro and anti coagulant factors in liver disease. She has been seen in hematology here in the past, who recommended life long AC. Will message her previous hematologist that she saw in 2018 with the question of safety of anticoagulation for her     RTC 1 month, will also enroll in complex care management     Tari Moser MD MSc  Hepatology/Liver Transplant

## 2020-11-07 PROBLEM — K76.82 HEPATIC ENCEPHALOPATHY (H): Status: ACTIVE | Noted: 2020-01-01

## 2020-11-07 NOTE — H&P
[facility]    Hospitalist History and Physical    Name: Caroline Barajas    MRN: [id number]  YOB: 1965    Age: 55 year old  Date of Admission:  11/7/2020  Date of Service (when I saw the patient): 11/07/20    Assessment & Plan   Caroline Barajas is a 55 year old female with past medical history significant for alcoholic hepatitis, admissions for alcohol hepatitis in 2017 last admission on 10/20/2020, history of factor V Leiden deficiency on Eliquis for DVT PE was a direct admit for abnormal labs and concern for SBP.    Patient was hospitalized for Niurka Otitis in October 2020 was discharged to TCU and recently was discharged home from TCU.  Patient was complaining of increased dizziness and lightheadedness.  She was seen by her hepatologist at the [facility] who was concerned about SBP given leukocytosis, abdominal discomfort.  He was advised to go to the emergency room for paracentesis and to rule out SBP        Severe alcoholic hepatitis: Elevated transaminases slightly better than yesterday  -In past she has been unresponsive to steroids,  -she is chronically hypotensive requiring midodrine intermittently, she stopped her diuretics 11/ 3.    -Her last drink was September 2020.  -In the emergency room paracentesis was attempted but there was dry tap  -On exam patient does have abdominal distention  -Ordered ultrasound-guided paracentesis with labs  -Clinically she is tender on exam.  We will start on ceftriaxone empirically  -Resume lactulose  -Continue rifaximin  -GI consulted  -Patient not a transplant candidate as last drink was September 2020 into hospitalization for alcoholic hepatitis.  -She complains of intermittent fog and dizziness.  No evidence of encephalopathy.  Is totally oriented at this time    Factor V Leiden deficiency history of pulmonary embolism and PE  -Patient has been on Eliquis  -GI recommended stopping Eliquis due to severe liver disease and high risk for  "bleeding  -INR is elevated  -We will consult hematology  -Hold off anticoagulation          DVT Prophylaxis: Has elevated INR due to liver disease  Code Status: Full Code    Disposition: Admitted as inpatient    Primary Care Physician   Brian Davis    Chief Complaint   Lightheadedness and dizziness sent by GI for further evaluation and treatment    History is obtained from the patient    History of Present Illness   Caroline Barajas is a 55 year old female with past medical history significant for alcoholic dependence and hospitalizations x2 for severe alcohol hepatitis.  Was recently admitted to hospital in October 2020 was sent to TCU after that.  Returned home 3 days ago.  Last 1 to 2 days she complained of increased dizziness not feeling well felt like \"fog\".  Denies any chest pain no shortness of breath.    She was seen by her provider at the [facility] hematology clinic.  Patient had leukocytosis and there was concern for possible SBP.  She was directed to go to the emergency room where paracentesis was attempted but failed patient was transferred to [facility] for further evaluation and treatment.    She denies chest pain, no shortness of breath no fever or chills. Complains of generalized malaise weakness fatigue lightheadedness.  Review of all the symptoms are negative      Past Medical History    Past Medical History:   Diagnosis Date     BACKACHE NOS 1/19/2007     EDEMA 3/26/2008     INSOMNIA NEC 1/19/2007     LUMBAR DISC DISPLACEMENT 2/13/2007     LUMBOSACRAL NEURITIS NOS 2/13/2007     OPIOID DEPENDENCE-CONTIN 1/19/2007     PRIMARY HYPERCOAGULABLE STATE 3/26/2008    HOMOZYGOUS FOR FACTOR V LEIDEN MUTATION PER PT REPORT         Past Surgical History   No past surgical history on file.    Prior to Admission Medications   Prior to Admission Medications   Prescriptions Last Dose Informant Patient Reported? Taking?   CONSTULOSE 10 GM/15ML solution   Yes No   Sig: Take 15 mLs by mouth daily "   ELIQUIS ANTICOAGULANT 2.5 MG tablet   Yes No   Sig: Take 2.5 mg by mouth 2 times daily   QUEtiapine (SEROQUEL) 300 MG tablet   Yes No   Sig: Take 300 mg by mouth At Bedtime   calcium carbonate (TUMS) 500 MG chewable tablet   Yes No   Sig: Take 400 mg by mouth every 4 hours as needed   levothyroxine (SYNTHROID/LEVOTHROID) 88 MCG tablet   Yes No   Sig: Take 88 mcg by mouth daily   melatonin 3 MG tablet   Yes No   Sig: Take 3 mg by mouth nightly as needed for sleep   midodrine (PROAMATINE) 10 MG tablet   Yes No   Sig: Take 10 mg by mouth 3 times daily   pantoprazole (PROTONIX) 40 MG EC tablet   Yes No   Sig: Take 40 mg by mouth daily   promethazine (PHENERGAN) 25 MG tablet   Yes No   Sig: Take 1 tablet by mouth every 4 hours as needed for nausea   rifaximin (XIFAXAN) 550 MG TABS tablet   Yes No   Sig: Take 550 mg by mouth 2 times daily      Facility-Administered Medications: None     Allergies   No Known Allergies    Social History   Social History     Tobacco Use     Smoking status: Never Smoker     Smokeless tobacco: Never Used   Substance Use Topics     Alcohol use: Not Currently     Comment: Last drink 9/30/2020     Social History     Social History Narrative     Not on file     Lives with .  Denies any use of smoking.  Last alcohol drink was September 2020    Family History   I have reviewed this patient's family history and updated it with pertinent information if needed.   Family History   Problem Relation Age of Onset     Blood Disease Sister         Factor V Leiden mutation and hx DVT         Review of Systems   A Comprehensive greater than 10 system review of systems was carried out.  Pertinent positives and negatives are noted above.  Otherwise negative for contributory information.    Physical Exam   Temp: 97.9  F (36.6  C) Temp src: Oral BP: (!) 89/54 Pulse: 77   Resp: 16 SpO2: 98 % O2 Device: None (Room air)    Vital Signs with Ranges  Temp:  [97.6  F (36.4  C)-97.9  F (36.6  C)] 97.9  F (36.6   C)  Pulse:  [77-94] 77  Resp:  [16] 16  BP: ()/(54-70) 89/54  SpO2:  [98 %-100 %] 98 %  185 lbs 1.6 oz    GEN:  Alert, oriented x 3, appears comfortable, no overt distress  HEENT:  Normocephalic/atraumatic, jaundiced, no nasal discharge, mouth dry  CV:  Regular rate and rhythm, soft systolic murmur or JVD.  S1 + S2 noted, no S3 or S4.  LUNGS: Few bibasilar crackles clear to auscultation bilaterally without rales/rhonchi/wheezing/retractions.  Symmetric chest rise on inhalation noted.  ABD:  Active bowel sounds, diffuse lower abdominal tenderness.  EXT:  + edema.    SKIN:  Dry to touch, jaundiced  NEURO:  Symmetric muscle strength,   No new focal deficits appreciated.    Data   Data reviewed today:  I personally reviewed no images or EKG's today.    Recent Labs   Lab 11/07/20  0556 11/07/20 0344 11/06/20  1159   WBC 16.5* 18.0* 24.5*   HGB 8.3* 8.4* 9.8*   HCT 26.5* 25.6* 30.6*   * 109* 111*    217 257     Recent Labs   Lab 11/07/20  0344 11/06/20  1159    136   POTASSIUM 3.0* 3.5   CHLORIDE 109 103   CO2 19* 21   ANIONGAP 12 11   GLC 93 139*   BUN 16 17   CR 1.06* 1.02   GFRESTIMATED 60* 62   GFRESTBLACK 69 72   DEONTE 7.8* 8.8     No results for input(s): CULT in the last 168 hours.  No results for input(s): NTBNPI, NTBNP in the last 168 hours.  No results for input(s): CKT in the last 168 hours.    Invalid input(s): CK, CK TOTAL  Recent Labs   Lab 11/07/20  0344 11/06/20  1159   CR 1.06* 1.02     Recent Labs   Lab 11/07/20  0344 11/06/20  1159   * 268*   * 236*   ALKPHOS 183* 216*   BILITOTAL 27.6* 30.2*   TRISHA 13  --      Recent Labs   Lab 11/06/20  1159   INR 1.87*     No results for input(s): LACT in the last 168 hours.  No results for input(s): LIPASE in the last 168 hours.  No results for input(s): TSH in the last 168 hours.  No results for input(s): TROPONIN, TROPI, TROPR in the last 168 hours.    Invalid input(s): TROP, TROPONINIES  No results for input(s): COLOR,  APPEARANCE, URINEGLC, URINEBILI, URINEKETONE, SG, UBLD, URINEPH, PROTEIN, UROBILINOGEN, NITRITE, LEUKEST, RBCU, WBCU in the last 168 hours.    No results found for this or any previous visit (from the past 24 hour(s)).

## 2020-11-07 NOTE — CONSULTS
GI:  New consult noted  Patient with etoh hepatitis followed at [facility]  Now with leukocytosis. OTW stable  Ceftriaxone already ordered.  Will see in AM.  Tejinder Bush MD  [phone]  After 5 pm or on weekends  [phone]    ADDENDUM:     [facility] consulted in error. Should go to Dr. Shelton . Discussed with Dr. Shelton, and he will see patient.  Call with any questions or concerns.     MD JUAN M BocanegraGI

## 2020-11-07 NOTE — TELEPHONE ENCOUNTER
Hospitalist Huddle Documentation    Acuity/Preferred Bed Type: inpatient, med/surg floor   Infection Concerns: SBP  Additional Specialist Needed Or Specialist Already Contacted: likely GI due to h/o chronic alcoholic cirrhosis coming in with concern for SBP  Timely Treatments Needed: No  Important things to know/address during hospitalization: no sitter    Sign out [facility]  Patient has past medical history significant for chronic alcoholic liver disease with her last drink on 09/2020.  Patient was recently admitted to [facility] from 10/10-10/16 for hepatic encephalopathy where she received steroids and antibiotics and discharged to TCU on 10/26.  The patient was seen in follow-up by hepatology earlier today, refer to note by Dr. Moser in chart.  The patient had labs performed in clinic today which showed a new leukocytosis of 24.5 with previous normal white counts on 6/5 and 10/26 with recent steroid treatment.  Hepatology recommended the patient present to the ED for likely admission in order to rule out infectious etiology causing rising WBC.  Work-up performed in the ED included blood cultures that are pending, attempted paracentesis which was a dry tap, chest x-ray that was negative, UA showed epithelial cells and no signs of underlying infection with urine culture pending, and no further abdominal imaging performed.  The patient had a normal lactic acid.  LFTs today are stable since recent admission with total bilirubin noted to be 30.2.  Sodium and potassium levels WNL. Hgb stable at 9.8.  INR stable at 1.87. VSS with patient being afebrile, BP of 98/61, pulse of 80 bpm, respiratory rate of 20, and 100% on room air.  The patient did have a recent renal ultrasound while admitted at [facility] which was normal.  The patient does not endorse infectious symptoms including denying fever, chills, shortness of breath, or abdominal pain. Patient received a  dose of IV Zosyn and IV Vancomycin prior to admission.  The ED provider did note that the patient has low blood pressure at baseline.  The reason for transfer is there is no GI or hepatology at the current facility and need for likely ultrasound guided paracentesis as well there are no beds available at [facility] or [facility].    Renée Paula PA-C

## 2020-11-07 NOTE — PHARMACY-ADMISSION MEDICATION HISTORY
Admission medication history interview status for this patient is complete. See [facility] admission navigator for allergy information, prior to admission medications and immunization status.     Medication history interview done via telephone during Covid-19 pandemic, indicate source(s): Patient and patient's  (Guillaume) who manages all of pts medications  Medication history resources (including written lists, pill bottles, clinic record): Discharge report  - [facility]   Pharmacy: [facility] in [place]    Changes made to PTA medication list:  Added: mvi  Deleted: tums, eliquis  Changed: timing of synthroid and protonix    Actions taken by pharmacist (provider contacted, etc):None     Additional medication history information: Patient should be on rifaximin but hasn't had a dose since Tuesday since she's waiting on a  prior authorization from MD to get it covered by her insurance at [facility].     Medication reconciliation/reorder completed by provider prior to medication history?  Y   (Y/N)         Prior to Admission medications    Medication Sig Last Dose Taking? Auth Provider   CONSTULOSE 10 GM/15ML solution Take 15 mLs by mouth daily 11/6/2020 at Unknown time Yes Reported, Patient   levothyroxine (SYNTHROID/LEVOTHROID) 88 MCG tablet Take 88 mcg by mouth At Bedtime  Past Week at Unknown time Yes Reported, Patient   melatonin 3 MG tablet Take 3 mg by mouth nightly as needed for sleep Past Week at Unknown time Yes Reported, Patient   midodrine (PROAMATINE) 10 MG tablet Take 10 mg by mouth 3 times daily 11/6/2020 at Unknown time Yes Reported, Patient   multivitamin w/minerals (THERA-VIT-M) tablet Take 1 tablet by mouth daily Past Week at Unknown time Yes Unknown, Entered By History   pantoprazole (PROTONIX) 40 MG EC tablet Take 40 mg by mouth At Bedtime  11/6/2020 at Unknown time Yes Reported, Patient   promethazine (PHENERGAN) 25 MG tablet Take 1 tablet by mouth every 4 hours as needed for nausea Past Week at Unknown  time Yes Reported, Patient   QUEtiapine (SEROQUEL) 300 MG tablet Take 300 mg by mouth At Bedtime 11/6/2020 at Unknown time Yes Reported, Patient   rifaximin (XIFAXAN) 550 MG TABS tablet Take 550 mg by mouth 2 times daily 11/3/2020 at Waiting on fill from pharmacy  Reported, Patient

## 2020-11-07 NOTE — PROVIDER NOTIFICATION
Web based paged hospitalist on call Direct admit has arrived need orders specifically patient asking to eat

## 2020-11-07 NOTE — PLAN OF CARE
Direct admit from [facility]. Alert and oriented x 4, oriented to room and call system. VSS: Soft BP's. Denies pain. Up with [id number] and walker to BSC. dizziness and light headed noted with ambulation. Low sodium diet. Ate cold sandwich and ananda well. COVID nasal swap done and sent to lab. Nursing continue to monitor.

## 2020-11-07 NOTE — PROVIDER NOTIFICATION
MD paged the following:    Patient  11/7/2020 03:44  Potassium: 3.0 (L)    No redraw taken and not currently on K protocol. Would you like me to initiate either standard replacement or high replacement? Thanks!    Krystina Angel RN on 11/7/2020 at 11:36 AM

## 2020-11-07 NOTE — PROGRESS NOTES
No charge note    55-year-old woman with severe alcoholic hepatitis admitted earlier this morning by Dr. Ritchie, history of factor V Leyden deficiency on Eliquis for DVT and PE, abnormal labs and concern for SBP.  Transferred to [facility] from outside hospital due to lack of bed availability.  Attempt for paracentesis in the emergency room was unsuccessful, ultrasound-guided was ordered in addition to gastroenterology and hematology consultations  When I saw her she was oriented, answering questions appropriately  Hypokalemia replacement protocol ordered  Gastroenterology has recommended stopping Eliquis due to severe liver disease and high risk of bleeding, hematology input was requested by admitting physician, for now we will hold

## 2020-11-07 NOTE — PLAN OF CARE
Patient oriented x4, but confused at times, very tired this AM. Ambulating to bathroom SBA. Voiding well. Patient on K replacement protocol from K=3.0 this AM, will recheck this evening and tomorrow morning. Patient remains hypotensive, but stable, (states low BP is baseline). Denies pain/n/v. Has denied dizziness when ambulating. Generalized weakness. Skin and sclera are very jauniced. Bili=27.6 this AM, MD aware, monitoring. Having paracentesis when COVID swab results come back, need to call radiology when results are complete. Waiting to get imaging from [facility], should arrive from [place] later today. Will continue to monitor and follow plan of care.    BP (!) 84/34 (BP Location: Right arm)   Pulse 80   Temp 97.8  F (36.6  C) (Oral)   Resp 16   Wt 84 kg (185 lb 1.6 oz)   SpO2 96%   BMI 29.43 kg/m      Krystina Angel RN on 11/7/2020 at 2:24 PM

## 2020-11-07 NOTE — CONSULTS
Care Management Initial Consult    General Information  Assessment completed with: Patient,    Type of CM/SW Visit: Initial Assessment  Primary Care Provider verified and updated as needed: Yes   Readmission within the last 30 days:        Reason for Consult: care coordination/care conference;discharge planning    Communication Assessment  Patient's communication style: spoken language (English or Bilingual)    Hearing Difficulty or Deaf: no   Wear Glasses or Blind: yes    Cognitive  Cognitive/Neuro/Behavioral: WDL                      Living Environment:   People in home: child(bhavya), adult;spouse     Current living Arrangements: house      Able to return to prior arrangements: yes       Family/Social Support:  Care provided by: self  Provides care for: no one  Marital Status:   ;Children          Description of Support System: Supportive;Involved    Support Assessment: Adequate family and caregiver support    Current Resources:   Skilled Home Care Services: Physicial Therapy;Occupational Therapy  Community Resources: Home Care  Equipment currently used at home: grab bar, toilet    Lifestyle & Psychosocial Needs:        Socioeconomic History     Marital status:      Spouse name: Not on file     Number of children: Not on file     Years of education: Not on file     Highest education level: Not on file     Tobacco Use     Smoking status: Never Smoker     Smokeless tobacco: Never Used   Substance and Sexual Activity     Alcohol use: Not Currently     Comment: Last drink 9/30/2020     Drug use: Not Currently     Comment: past marijuana use       Functional Status:  Prior to admission patient needed assistance: Pt reports that she was using a WW independently prior to admission.               Additional Information:  Per chart review, pt had a recent hospital stay 10/21-10/26 and discharge to TCU from 10/26-11/3. She discharged home on 11/3 with her , adult dtr and home PT/OT through [facility] [phone]. Called and attempted to verify home services but unable to on the weekend.     Called into pt's room to discuss further, she verifies this information to be correct. Additionally, her  and dtr help with her medications, cooking and cleaning. Her  will drive her home at discharge. She would like her home PT/OT resumed through [facility] when she discharges, CM will need to contact them M-F. Pt denies having any other needs at this time.     Will continue to follow patient for any additional discharge needs.     Pushpa Castrejon RN BSN   Inpatient Care Coordination  [facility]   Phone [phone]

## 2020-11-07 NOTE — PROVIDER NOTIFICATION
Web base paged hospitalist on call  critical bilirubin labs 27.6 Need order for heparin drip for nurse to manage per pharmacy

## 2020-11-07 NOTE — CONSULTS
Consult Date:  11/07/2020      HISTORY OF PRESENT ILLNESS:  This is a 55-year-old female who has been drinking heavily for at least the last 4 years, who has had 3 presentations with alcoholic hepatitis in the past, most recently in October when she came in with alcoholic hepatitis at [facility].  She was transferred to TCU at an outside hospital and spent a few days there and was feeling better and she was sent home.  She was seen in clinic by Dr. Mejia at the [place] and she thought that perhaps the patient was suffering from spontaneous bacterial peritonitis and asked that she be hospitalized and she ended up at this hospital.  When Dr. Mejia saw her laboratory data, it showed that her bilirubin was 30, her alkaline phosphatase was elevated at 216, ALT was 236 and AST was 268.  The white count was elevated at 24,500.  Her hemoglobin was decreased at 9.8 with an elevated MCV.  She has Leiden factor V deficiency and has been on Eliquis in the past and Dr. Mejia favored stopping that drug because of the risk of her having a GI bleed.  At the same time, the patient was taking proton pump inhibitors and lactulose syrup to help with her hepatic encephalopathy.  She was seen in the emergency room and an attempted paracentesis was done and no fluid was obtained, so she is going to have another attempt at using an ultrasound.  The patient has been chronically hypotensive as well.  The patient was deemed not a transplant candidate because it has only been 2 months since she stopped drinking.  The patient, since she has come in the hospital here, she has been started on antibiotics empirically.  On interviewing her, she did know the year and the day and the month.  She was a little less sure of her past medical history.  She said she never had a gastroscopy and she actually did have one 3 weeks ago.  She said she has never had a colonoscopy.  She says she had a bleed at [facility] and it turned  out to be a retroperitoneal bleed, but she could not describe that.  I found that in the notes.  She says that she is having normal bowel movements.  No black tarry stools or blood in her stool.  She is not having any nausea or vomiting.  She denies any abdominal pain.  The patient had been on steroids and those were stopped.        At this point in time on examination, she is severely jaundiced with scleral icterus and icteric skin.  The abdomen has a doughy feeling with no shifting dullness or palpable liver edge.  There are no other palpable masses and there is no tenderness anywhere.  The most recent laboratories at this hospital show that her INR is elevated and her bilirubin is still markedly elevated with a depressed albumin and elevated alkaline phosphatase, ALT and AST, which I would expect. Ammonia level was done which was normal, which is not a good indicator for encephalopathy.   I should note that in the exam I thought she had a little bit of asterixis and no edema in her lower extremities.        So, at this point in time, the patient is going to be followed with hopefully another tap so that we can see if she has any infection in her abdomen. I would not change any of her other medications and we will see how she does with the current treatment.      Thank you for allowing me to see this patient.         ZARI PARISH MD             D: 2020   T: 2020   MT: LORY      Name:     LOKESH BORGES   MRN:      [id number]        Account:       [id number]   :      1965           Consult Date:  2020      Document: [id number]

## 2020-11-08 NOTE — PROGRESS NOTES
[facility]    Hematology/Oncology Follow-up Note      Today's Date: 11/08/20  Date of Admission:  11/7/2020  Reason for Consult: homozygous Factor V Leiden mutation, history of VTE      ASSESSMENT/PLAN:  Caroline Barajas is a 55 year old female with:    1) History of recurrent VTE, factor V Leiden homozygosity: Saw Dr. Benavides in May 2018, who recommended that she continue on lifelong anticoagulation.  She has history of recurrent DVT, and has had recurrence while off of anticoagulation for brief period of 6 weeks.  Due to her history of recurrent clot, she was recommended for lifelong anticoagulation.  Patient says that she has not had recent issues with bleeding on apixaban.  She has also tried various other agents in the past.       Patient saw hepatology on 11/6/20, and due to her severe liver disease and higher risk of bleeding, it was recommended that she stop apixaban.  Hepatology did message Dr. Benavides for her opinion.       I discussed this with the patient.  Patient says that she would be uncomfortable stopping anticoagulation due to her history of recurrent clots.       She may have a paracentesis coming later today, so will hold anticoagulation for now for procedure.     After that, I would probably favor continuing her on her home apixaban, considering her high risk of clotting.  She thinks that she is on lower dose of 2.5 mg twice a day.  However, she also has high risk of bleeding, and she will need close follow-up with her hepatologist and hematologist after discharge to help manage her anticoagulation in this complex patient.       -hold apixaban for now for procedures  -considering resuming it upon discharge, until she follows up with Dr. Benavides in the hematology clinic outpatient     2) Alcoholic hepatitis:   -GI consulted  -possible paracentesis by radiology to evaluate for SBP  -follow-up with hepatology        INTERIM HISTORY: Caroline was seen in her room.  She says that she  "feels okay. She denies abdominal discomfort currently.       MEDICATIONS:  Current Facility-Administered Medications   Medication     cefTRIAXone (ROCEPHIN) 2 g vial to attach to  ml bag for ADULTS or NS 50 ml bag for PEDS     lactulose (CHRONULAC) solution 15 mL     levothyroxine (SYNTHROID/LEVOTHROID) tablet 88 mcg     lidocaine (LMX4) cream     lidocaine 1 % 0.1-1 mL     melatonin tablet 1 mg     midodrine (PROAMATINE) tablet 10 mg     pantoprazole (PROTONIX) EC tablet 40 mg     polyethylene glycol (MIRALAX) Packet 17 g     QUEtiapine (SEROquel) tablet 300 mg     rifaximin (XIFAXAN) tablet 550 mg     senna-docusate (SENOKOT-S/PERICOLACE) 8.6-50 MG per tablet 1 tablet    Or     senna-docusate (SENOKOT-S/PERICOLACE) 8.6-50 MG per tablet 2 tablet     sodium chloride (PF) 0.9% PF flush 3 mL     sodium chloride (PF) 0.9% PF flush 3 mL           ALLERGIES:  No Known Allergies      PHYSICAL EXAM:  Vital signs:  Temp: 97.6  F (36.4  C) Temp src: Temporal BP: 97/57 Pulse: 78   Resp: 16 SpO2: 98 % O2 Device: None (Room air)     Weight: 84 kg (185 lb 1.6 oz)  Estimated body mass index is 29.43 kg/m  as calculated from the following:    Height as of 11/6/20: 1.689 m (5' 6.5\").    Weight as of this encounter: 84 kg (185 lb 1.6 oz).    GENERAL/CONSTITUTIONAL: No acute distress.  EYES: +scleral icterus.  NEUROLOGIC: Alert, oriented, answers questions appropriately.  INTEGUMENTARY: +Jaundice.      LABS:  CBC RESULTS:   Recent Labs   Lab Test 11/08/20 0724   WBC 14.8*   RBC 2.25*   HGB 7.7*   HCT 24.6*   *   MCH 34.2*   MCHC 31.3*   RDW 19.2*          Recent Labs   Lab Test 11/08/20 0724 11/07/20 2004 11/07/20 0344 11/07/20 0344     --   --  140   POTASSIUM 3.6 4.0   < > 3.0*   CHLORIDE 112*  --   --  109   CO2 17*  --   --  19*   ANIONGAP 11  --   --  12   *  --   --  93   BUN 17  --   --  16   CR 1.03  --   --  1.06*   DEONTE 8.0*  --   --  7.8*    < > = values in this interval not displayed. "         Omaira Moya MD  Hematology/Oncology  [facility]

## 2020-11-08 NOTE — PLAN OF CARE
VS monitored, hypotensive. A&Ox3, fatigued. Denies pain and n/v. Baseline numbness/tingling in the lower extremities. Tolerating low sodium diet, small BM this shift. Voiding without difficulty, bright yellow urine. Ambulated to bathroom with assist of 1, gait belt and walker - pt reported some dizziness. K+ recheck was 4.0 after replacement completed. Negative COVID-19 test result, radiology notified. Abdominal paracentesis pending for tomorrow. Plan of care reviewed with pt.

## 2020-11-08 NOTE — PROGRESS NOTES
She is awaiting a paracentesis. She denies abdominal pain and slept poorly. There is asterixis present and her abdominal exam is benign. We will see what comes of the paracentesis.

## 2020-11-08 NOTE — PROGRESS NOTES
"    [facility]  Hospitalist Progress Note  Admit 11/7/2020  1:14 AM    Name: Caroline Barajas    MRN: [id number]  Provider:  Teo Avitia MD, [facility]    Date of Service: 11/08/2020     Reason for Stay (Diagnosis): Concern for SBP         Summary of hospital stay & Assessment/Plan:   Summary of Stay: Caroline Barajas is a 55 year old female who was admitted on 11/7/2020  55 year old female with past medical history significant for alcoholic hepatitis, admissions for alcohol hepatitis in 2017 last admission on 10/20/2020, history of factor V Leiden deficiency on Eliquis for DVT PE was a direct admit for abnormal labs and concern for SBP. Patient was hospitalized in October 2020 was discharged to TCU and recently was discharged home from TCU.  Patient was complaining of increased dizziness and lightheadedness.  She was seen by her hepatologist at the [facility] who was concerned about SBP given leukocytosis, abdominal discomfort.  He was advised to go to the emergency room for paracentesis and to rule out SBP.       Problem List:   1. Severe alcoholic hepatitis MELD score 26   2. Concern for SBP, paracentesis was delayed yesterday because of COVID-19 test was pandemic, today IR is very busy she is still on the schedule I spoke with  Dr. Bangura he is going to try to do her tap later today  3. Factor V Leyden deficiency with history of thromboembolism and PE Eliquis is now on hold, hematology recommends:     \"She may have a paracentesis coming later today, so will hold anticoagulation for now for procedure. After that, I would probably favor continuing her on her home apixaban, considering her high risk of clotting.  She thinks that she is on lower dose of 2.5 mg twice a day.  However, she also has high risk of bleeding, and she will need close follow-up with her hepatologist and hematologist after discharge to help manage her anticoagulation in this complex patient.  \"     Continue empiric IV antibiotic, " Pending paracentesis if there is any concern on the paracentesis I would empirically treat for SBP with a short course with antibiotics  Follow-up with gastroenterology        DVT Prophylaxis: Pneumatic Compression Devices  Code Status:  Full Code    Disposition Plan     Expected discharge in 2 days to prior living arrangement once paracentesis is done and she is clinically stable she can discharge with outpatient follow-up.     Entered: Teo Avitia 11/08/2020, 2:09 PM                 Interval History:       Doing well, frustrated that paracentesis is not done yet  Today's plan detailed above discussed with nursing               Physical Exam:   Physical Exam   Temp: 97.6  F (36.4  C) Temp src: Temporal BP: 97/57 Pulse: 78   Resp: 16 SpO2: 98 % O2 Device: None (Room air)    Vitals:    11/07/20 0120   Weight: 84 kg (185 lb 1.6 oz)     I/O last 3 completed shifts:  In: 780 [P.O.:780]  Out: 600 [Urine:600]    GEN:  Alert, oriented x 3, appears comfortable, no overt distress  HEENT:  Normocephalic/atraumatic, significant jaundiced, no nasal discharge, mouth dry  CV:  Regular rate and rhythm, soft systolic murmur or JVD.  S1 + S2 noted, no S3 or S4.  LUNGS: Few bibasilar crackles clear to auscultation bilaterally without rales/rhonchi/wheezing/retractions.  Symmetric chest rise on inhalation noted.  ABD:  Active bowel sounds, diffuse lower abdominal tenderness.  Ascites  EXT:  + + edema.    SKIN:  Dry to touch, jaundiced  NEURO:  Symmetric muscle strength,   No new focal weakness, positive asterixis    Medications       cefTRIAXone  2 g Intravenous Q24H     lactulose  15 mL Oral Daily     levothyroxine  88 mcg Oral Daily     midodrine  10 mg Oral TID     pantoprazole  40 mg Oral At Bedtime     polyethylene glycol  17 g Oral Daily     QUEtiapine  300 mg Oral At Bedtime     rifaximin  550 mg Oral BID     senna-docusate  1 tablet Oral BID    Or     senna-docusate  2 tablet Oral BID     sodium chloride (PF)  3 mL  Intracatheter Q8H     Data     -Data reviewed today:  I personally reviewed  all new labs and imaging results over the last 24 hours.    Recent Labs   Lab 11/08/20 0724 11/07/20  0556 11/07/20  0344   WBC 14.8* 16.5* 18.0*   HGB 7.7* 8.3* 8.4*   HCT 24.6* 26.5* 25.6*   * 112* 109*    214 217     Recent Labs   Lab 11/08/20  0724 11/07/20  2004 11/07/20  1233 11/07/20  0344 11/06/20  1159     --   --  140 136   POTASSIUM 3.6 4.0 3.4 3.0* 3.5   CHLORIDE 112*  --   --  109 103   CO2 17*  --   --  19* 21   ANIONGAP 11  --   --  12 11   *  --   --  93 139*   BUN 17  --   --  16 17   CR 1.03  --   --  1.06* 1.02   GFRESTIMATED 62  --   --  60* 62   GFRESTBLACK 72  --   --  69 72   DEONTE 8.0*  --   --  7.8* 8.8       No results found for this or any previous visit (from the past 24 hour(s)).    This document was produced using voice recognition software

## 2020-11-08 NOTE — PLAN OF CARE
Aox4. Denies pain, n/t, n/v. Sclera and skin very jaundiced. Patient very lethargic. Ambulating with SBA, complaining of intermittent dizziness. VSS with soft BP, baseline per patient. Bili=24.8, MD aware. Paracentesis not completed this shift, continuing to try and arrange. Will continue to monitor.    BP 97/57 (BP Location: Right arm)   Pulse 78   Temp 97.6  F (36.4  C) (Temporal)   Resp 16   Wt 84 kg (185 lb 1.6 oz)   SpO2 98%   BMI 29.43 kg/m      Krystina Angel RN on 11/8/2020 at 4:12 PM

## 2020-11-08 NOTE — IR NOTE
EXAM: LIMITED ABDOMINAL ULTRASOUND TO CHECK FOR ASCITES.    CLINICAL HISTORY: Distended abdomen in a patient with liver disease.    COMPARISON: Ultrasound dated 10/12/2020    IMPRESSION: No significant ascites identified. No suitable targets for paracentesis are noted. Tiny locule of fluid measuring 1.8 x 1.9 cm in the right upper quadrant. Therefore, paracentesis was not performed.

## 2020-11-08 NOTE — PROGRESS NOTES
Received a phone call from interventional radiology Dr. Bangura, minimal fluid on ultrasound too small to be tapped around the liver he does not recommend paracentesis.  Based on this information likely discharge on empiric few more days of antibiotic, discuss with GI tomorrow potentially she can go back to TCU.

## 2020-11-09 NOTE — PROVIDER NOTIFICATION
MD paged the following:    FYI: labs results this AM  Results for LOKESH BORGES (MRN [id number]) as of 11/9/2020 07:55   Ref. Range 11/9/2020 06:13   Bilirubin Total Latest Ref Range: 0.2 - 1.3 mg/dL 24.2 (HH)   Alkaline Phosphatase Latest Ref Range: 40 - 150 U/L 168 (H)   ALT Latest Ref Range: 0 - 50 U/L 152 (H)   AST Latest Ref Range: 0 - 45 U/L 175 (H)   Bilirubin Direct Latest Ref Range: 0.0 - 0.2 mg/dL 21.4 (H)   Thanks!    Krystina Angel RN on 11/9/2020 at 7:58 AM

## 2020-11-09 NOTE — PLAN OF CARE
Aox4. Very lethargic. Skin and sclera very jaundiced. Low appetite. Denies pain and n/v. Up to bathroom with assist of 1 and a walker. Eliquis restarted today. Bps soft (normal per patient). Oynz=982.2 this AM. Plan to discharge tomorrow to home with possible PT. Will continue to monitor and follow POC.    /56 (BP Location: Right arm)   Pulse 92   Temp 98.4  F (36.9  C) (Temporal)   Resp 16   Wt 84.1 kg (185 lb 4.8 oz)   SpO2 97%   BMI 29.46 kg/m      Krystina Angel RN on 11/9/2020 at 3:56 PM

## 2020-11-09 NOTE — PROGRESS NOTES
Prior Authorization Retail Medication Request    Medication/Dose: Rifaximin 550 mg  ICD code (if different than what is on RX):  K72.90  Previously Tried and Failed:  Lactulose alone  Rationale:  Reduce hospitalizations from HE  Insurance Name:    Insurance ID:        Pharmacy Information (if different than what is on RX)  Name:    Phone:     Please include out of pocket cost for patient once approved.

## 2020-11-09 NOTE — PLAN OF CARE
Pt A/O x 4, VSS w/soft BP. Tolerating 2g sodium diet, denies nausea. Transfers with Ax1/SBA, gait belt, and walker. Baseline neuropathy in bilateral LE's. Voiding in adequate amounts. Denies pain. Skin and scolera jaundice. Plans d/c to TCU.

## 2020-11-09 NOTE — CONSULTS
"CLINICAL NUTRITION SERVICES  -  ASSESSMENT NOTE      MALNUTRITION:  % Weight Loss:  None noted  % Intake:  No decreased intake noted  Subcutaneous Fat Loss:  Non observed though limited NFPE today   Muscle Loss: Unable to determine  Fluid Retention: Trace, generalized --> not using as indicator as do not suspect greatly masking true wt trending    Malnutrition Diagnosis: Patient does not meet two of the above criteria necessary for diagnosing malnutrition        REASON FOR ASSESSMENT  Caroline Barajas is a 55 year old female seen by Registered Dietitian for Provider Order - \"pt with advanced liver disease from ETOH. Family needs guidance re: nutrition for recovery\".     PMH of: Alcoholic hepatitis.    Admit 2/2: Severe alcoholic hepatitis.    NUTRITION HISTORY  - Information obtained from patient's significant other in room.  Patient herself sleeping and did not wake during conversation.  - Just returned to home from TCU.  - Diet at home: SO does all of cooking/grocery shopping and has been attempting to pay attention to low sodium and also promoting protein as able.  - Usual intakes: Meals BID-TID is baseline.  Feels patient's appetite greatly improved during the end of her TCU stay and also was at baseline most recently in the home setting.  - Barriers to PO intakes: None currently.   - Use of oral supplements: Taking Equate high protein drink at home.  - Allergies: NKFA.      CURRENT NUTRITION ORDERS  Diet Order:     2 gram Na    Current Intake/Tolerance:  % intake since admission.      NUTRITION FOCUSED PHYSICAL ASSESSMENT FOR DIAGNOSING MALNUTRITION)  No:  Patient sleeping              Observed:    Deferred    Obtained from Chart/Interdisciplinary Team:  - GI team following  - No documentation of PI  - Stooling patterns reviewed    ANTHROPOMETRICS  Height: 5' 7\"  Weight: 185 lbs 4.8 oz  Body mass index is 29.46 kg/m .  Weight Status:  Overweight BMI 25-29.9  Weight History:  Wt Readings from Last 10 " Encounters:   11/09/20 84.1 kg (185 lb 4.8 oz)   11/06/20 80.7 kg (177 lb 14.4 oz)   01/11/08 84.8 kg (187 lb)   02/23/07 81.3 kg (179 lb 3.2 oz)   01/17/07 83.2 kg (183 lb 8 oz)     - Currently with trace, generalized edema.      LABS  Labs reviewed:  Electrolytes  Potassium (mmol/L)   Date Value   11/09/2020 3.2 (L)   11/08/2020 3.6   11/07/2020 4.0    Blood Glucose  Glucose (mg/dL)   Date Value   11/09/2020 87   11/08/2020 101 (H)   11/07/2020 93   11/06/2020 139 (H)   10/09/2008 70    Inflammatory Markers  WBC (10e9/L)   Date Value   11/09/2020 14.8 (H)   11/08/2020 14.8 (H)   11/07/2020 16.5 (H)     Albumin (g/dL)   Date Value   11/09/2020 1.6 (L)   11/08/2020 1.7 (L)   11/07/2020 1.9 (L)      Magnesium (mg/dL)   Date Value   11/06/2020 2.2     Sodium (mmol/L)   Date Value   11/09/2020 142   11/08/2020 140   11/07/2020 140    Renal  Urea Nitrogen (mg/dL)   Date Value   11/09/2020 17   11/08/2020 17   11/07/2020 16     Creatinine (mg/dL)   Date Value   11/09/2020 0.96   11/08/2020 1.03   11/07/2020 1.06 (H)     Additional  No results found for: TRIG, URINEKETONE     B/P: 104/56, T: 99.5, P: 89, R: 16      MEDICATIONS  Medications reviewed:    apixaban ANTICOAGULANT  2.5 mg Oral BID     cefTRIAXone  2 g Intravenous Q24H     lactulose  15 mL Oral Daily     levothyroxine  88 mcg Oral Daily     midodrine  10 mg Oral TID     pantoprazole  40 mg Oral At Bedtime     polyethylene glycol  17 g Oral Daily     QUEtiapine  300 mg Oral At Bedtime     rifaximin  550 mg Oral BID     senna-docusate  1 tablet Oral BID    Or     senna-docusate  2 tablet Oral BID     sodium chloride (PF)  3 mL Intracatheter Q8H             ASSESSED NUTRITION NEEDS PER APPROVED PRACTICE GUIDELINES:    Dosing Weight 84 kg   Estimated Energy Needs: >/=2100 kcals (>/=25 Kcal/Kg)  Justification: maintenance  Estimated Protein Needs: >/=101 grams protein (>/=1.2 g pro/Kg)  Justification: preservation of lean body mass  Estimated Fluid Needs: per  MD      NUTRITION DIAGNOSIS:  Predicted inadequate nutrient intake (energy/protein) related to potential for decline in PO intakes pending hospital LOS and clinical course of alcoholic hepatitis.    NUTRITION INTERVENTIONS  Recommendations / Nutrition Prescription  Continue 2 gram Na diet.  Self-selection of high protein focus.    Ok for prn high protein supplements.      Implementation    Nutrition education: Provided education on high protein/low sodium diet with SO:  Assessed learning needs, learning preferences, and willingness to learn    Nutrition Education (Content):  a) Provided handout on low sodium recipes, seasonings, and 2 gram Na diet  b) Discussed high protein, recommendations for </=2000 mg Na/day    Nutrition Education (Application):  a) Discussed eating habits and recommended alternative food choices.  Encouraged protein at each meal/snack.    SO verbalizes understanding of diet by naming 3 diet changes he plans to make (cottage cheese, tacos, and monitoring of Na intake for at least 3 days at discharge).    Anticipate good compliance with SO's support    Diet Education - refer to Education Flowsheet    Medical Food Supplement: As above.     Collaboration and Referral of Nutrition care: Discussed consult with Dr. Washington.    Nutrition Goals  Patient to consume at least 75% of meals or supplements TID.      MONITORING AND EVALUATION:  Progress towards goals will be monitored and evaluated per protocol and Practice Guidelines          Naz Napoles RDN, LD  Clinical Dietitian  3rd floor/ICU: [phone]  All other floors: [phone]  Weekend/holiday: [phone]

## 2020-11-09 NOTE — PROGRESS NOTES
11/09/20 1720   Quick Adds   Type of Visit Initial PT Evaluation   Living Environment   People in home spouse   Current Living Arrangements house   Home Accessibility stairs to enter home;stairs within home   Number of Stairs, Main Entrance 5   Stair Railings, Main Entrance railing on right side (ascending)   Number of Stairs, Within Home, Primary 10   Stair Railings, Within Home, Primary railing on right side (ascending)   Transportation Anticipated family or friend will provide   Living Environment Comments Pt's spouse reports that she can stay on the main level if needed.    Self-Care   Usual Activity Tolerance moderate   Current Activity Tolerance moderate   Regular Exercise No   Equipment Currently Used at Home grab bar, toilet   Activity/Exercise/Self-Care Comment Has a walker, but has not been using.    Disability/Function   Fall history within last six months no   Change in Functional Status Since Onset of Current Illness/Injury yes   General Information   Onset of Illness/Injury or Date of Surgery 11/07/20   Referring Physician Gilbert Washington MD   Patient/Family Therapy Goals Statement (PT) Discharge to home   Pertinent History of Current Problem (include personal factors and/or comorbidities that impact the POC) 55 year old female with past medical history significant for alcoholic hepatitis, admissions for alcohol hepatitis in 2017 last admission on 10/20/2020, history of factor V Leiden deficiency on Eliquis for DVT PE was a direct admit for abnormal labs and concern for SBP.    Existing Precautions/Restrictions fall   Weight-Bearing Status - LLE full weight-bearing   Weight-Bearing Status - RLE full weight-bearing   Cognition   Orientation Status (Cognition) person;place   Affect/Mental Status (Cognition) confused   Follows Commands (Cognition) delayed response/completion;increased processing time needed   Pain Assessment   Patient Currently in Pain No   Integumentary/Edema   Integumentary/Edema no  deficits were identifed   Posture    Posture Forward head position;Protracted shoulders   Range of Motion (ROM)   ROM Comment WFL   Strength   Strength Comments Decreased functional strength as seen by the pt's need for assist with transfers/mobility   Bed Mobility   Comment (Bed Mobility) sit<>supine Erasmo   Transfers   Transfer Safety Comments sit<>stand CGA   Gait/Stairs (Locomotion)   Comment (Gait/Stairs) CGA with FWW   Balance   Balance Comments Benefits from UE support for safe gait   Sensory Examination   Sensory Perception patient reports no sensory changes   Coordination   Coordination no deficits were identified   Muscle Tone   Muscle Tone no deficits were identified   Clinical Impression   Criteria for Skilled Therapeutic Intervention yes, treatment indicated   PT Diagnosis (PT) Impaired functional mobility   Influenced by the following impairments Weakness, deconditioning, anxiety regarding falling   Functional limitations due to impairments Difficulty with bed mobility, transfers, ambulation, stairs   Clinical Presentation Stable/Uncomplicated   Clinical Presentation Rationale medically stable, clear POC   Clinical Decision Making (Complexity) low complexity   Therapy Frequency (PT) Daily   Predicted Duration of Therapy Intervention (days/wks) 5 days   Planned Therapy Interventions (PT) balance training;bed mobility training;gait training;home exercise program;patient/family education;ROM (range of motion);stair training;strengthening;stretching;transfer training   Risk & Benefits of therapy have been explained evaluation/treatment results reviewed;care plan/treatment goals reviewed;risks/benefits reviewed;current/potential barriers reviewed;participants voiced agreement with care plan;participants included;patient;spouse/significant other   PT Discharge Planning    PT Discharge Recommendation (DC Rec) home with assist;home with home care physical therapy   PT Rationale for DC Rec Pt is able to  demonstrate basic mobiltiy skills with minimal to no assist. pt's spouse has been assisting PRN since discharge from TCU. Anticipate pt will be able to complete all mobility skills required for safe discharge home, with spouse assisting on stairs. HHPT to maximize safety and indep in the home environment.    PT Brief overview of current status  Ax1 WW   Total Evaluation Time   Total Evaluation Time (Minutes) 5

## 2020-11-09 NOTE — PROGRESS NOTES
[facility]  Hospitalist Progress Note  Admit 11/7/2020  1:14 AM    Name: Caroline Barajas    MRN: [id number]  Provider:  Gilbert Washington MD    Date of Service: 11/09/2020     Reason for Stay (Diagnosis): Concern for SBP         Summary of hospital stay & Assessment/Plan:   Summary of Stay: Caroline Barajas is a 55 year old female who was admitted on 11/7/2020  55 year old female with past medical history significant for alcoholic hepatitis, admissions for alcohol hepatitis in 2017 last admission on 10/20/2020, history of factor V Leiden deficiency on Eliquis for DVT PE was a direct admit for abnormal labs and concern for SBP. Patient was hospitalized in October 2020 was discharged to TCU and recently was discharged home from TCU.  Patient was complaining of increased dizziness and lightheadedness.  She was seen by her hepatologist at the [facility] who was concerned about SBP given leukocytosis, abdominal discomfort.  He was advised to go to the emergency room for paracentesis and to rule out SBP.     Today, pt has a rising temp, though Tmax 100.1.  Pt non-commital about how she was feeling this am.  Contacted pt's  who indicates that she was looking better over the weekend, similar to her baseline.      Problem List:   1. Severe alcoholic hepatitis MELD score 26, chronic liver failure.  2. Concern for SBP, paracentesis was delayed initially because of COVID-19 test was pandemic.  Subsequently, the pt was found to not have enough fluid to tap.   -on empiric therapy for SBP with Ceftriaxone.  Will complete 5 days total or total duration of hospitalization, whichever is less.    3. Factor V Leyden deficiency with history of thromboembolism.  Resumed apixaban this am.   4. Alcohol cessation. Plan for sobriety needs clarification.     DVT Prophylaxis: Pneumatic Compression Devices  Code Status:  Full Code    Disposition Plan     Expected discharge tomorrow to prior living arrangement assuming  adequate independence and unless fevers or other worsening of her condition.  Likely on empiric abx for several days.     Entered: Gilbert Washington 11/09/2020, 8:56 AM           Interval History:   Chart reviewed, pt interviewed.      NO paracentesis planned, inadequate amount of ascites, per IR.   Will ask PT to meet with pt.      Today's plan detailed above discussed with nursing               Physical Exam:   Physical Exam   Temp: 100.1  F (37.8  C) Temp src: Temporal BP: (!) 85/47 Pulse: 83   Resp: 16 SpO2: 98 % O2 Device: None (Room air)    Vitals:    11/07/20 0120 11/09/20 0600   Weight: 84 kg (185 lb 1.6 oz) 84.1 kg (185 lb 4.8 oz)     I/O last 3 completed shifts:  In: 675 [P.O.:675]  Out: 30 [Urine:30]    GEN:  Alert, oriented x 3, appears comfortable, no overt distress  HEENT:  Normocephalic/atraumatic, significant jaundiced, no nasal discharge, mouth dry  CV:  Regular rate and rhythm, soft systolic murmur or JVD.  S1 + S2 noted, no S3 or S4.  LUNGS: Few bibasilar crackles clear to auscultation bilaterally without rales/rhonchi/wheezing/retractions.  Symmetric chest rise on inhalation noted.  ABD:  Active bowel sounds, diffuse lower abdominal tenderness.    EXT:   + edema.    SKIN:  Dry to touch, jaundiced  NEURO:  Symmetric muscle strength,   No new focal weakness, positive asterixis    Medications       apixaban ANTICOAGULANT  2.5 mg Oral BID     cefTRIAXone  2 g Intravenous Q24H     lactulose  15 mL Oral Daily     levothyroxine  88 mcg Oral Daily     midodrine  10 mg Oral TID     pantoprazole  40 mg Oral At Bedtime     polyethylene glycol  17 g Oral Daily     potassium chloride  40 mEq Oral Once     QUEtiapine  300 mg Oral At Bedtime     rifaximin  550 mg Oral BID     senna-docusate  1 tablet Oral BID    Or     senna-docusate  2 tablet Oral BID     sodium chloride (PF)  3 mL Intracatheter Q8H     Data     -Data reviewed today:  I personally reviewed  all new labs and imaging results over the last 24  hours.    Recent Labs   Lab 11/09/20  0613 11/08/20 0724 11/07/20  0556   WBC 14.8* 14.8* 16.5*   HGB 8.1* 7.7* 8.3*   HCT 25.3* 24.6* 26.5*   * 109* 112*    207 214     Recent Labs   Lab 11/09/20  0613 11/08/20  0724 11/07/20 2004 11/07/20 0344 11/07/20  0344    140  --   --  140   POTASSIUM 3.2* 3.6 4.0   < > 3.0*   CHLORIDE 114* 112*  --   --  109   CO2 17* 17*  --   --  19*   ANIONGAP 11 11  --   --  12   GLC 87 101*  --   --  93   BUN 17 17  --   --  16   CR 0.96 1.03  --   --  1.06*   GFRESTIMATED 67 62  --   --  60*   GFRESTBLACK 78 72  --   --  69   DEONTE 8.0* 8.0*  --   --  7.8*    < > = values in this interval not displayed.       Recent Results (from the past 24 hour(s))   US Abdomen Limited    Narrative    EXAM: LIMITED ABDOMINAL ULTRASOUND FOR CHECKING FOR ASCITES 11/8/2020  4:49 PM    CLINICAL HISTORY: Distended abdomen in a patient with liver disease.    COMPARISON: Ultrasound dated 10/12/2020      Impression    IMPRESSION: No significant ascites identified. No suitable targets for  paracentesis is noted. Tiny locule of fluid measuring 1.8 x 1.9 cm in  the right upper quadrant. Therefore, paracentesis was not performed.       This document was produced using voice recognition software

## 2020-11-09 NOTE — PROGRESS NOTES
[facility]    Hematology/Oncology Follow-up Note      Today's Date: 11/09/20  Date of Admission:  11/7/2020  Reason for Consult: homozygous Factor V Leiden mutation, history of VTE        ASSESSMENT/PLAN:  Caroline Barajas is a 55 year old female with:     1) History of recurrent VTE, factor V Leiden homozygosity: Saw Dr. Benavides in May 2018, who recommended that she continue on lifelong anticoagulation. She has history of recurrent DVT, and has had recurrence while off of anticoagulation for brief period of 6 weeks.  Due to her history of recurrent clot, she was recommended for lifelong anticoagulation and she has not had recent issues with bleeding on apixaban.  She has also tried various other agents in the past.       Patient saw hepatology on 11/6/2020, and due to her severe liver disease and higher risk of bleeding, it was recommended that she stop apixaban. Hepatology did message Dr. Benavides for her opinion.  Patient told Dr Moya that she would be uncomfortable stopping anticoagulation due to her history of recurrent clots.       She was scheduled for paracentesis yesterday, however there was no fluid seen on ultrasound.  Our team would favor continuing her home apixaban 2.5 mg considering her high risk of clotting.  However, she also has high risk of bleeding, and she will need close follow-up with her hepatologist and hematologist after discharge to help manage her anticoagulation in this complex patient.       -considering resuming apixaban upon discharge, until she follows up with Dr. Benavides in the hematology clinic outpatient    2) Heme: Hemoglobin is improved to 8.1, WBC stable at 14.8.  Platelets normal at 188.  No active bleeding.    -Consider blood transfusion if hemoglobin less than 7.0 and/or platelets if less than 20 or bleeding.     3) Alcoholic hepatitis:   -GI consulted  -follow-up with hepatology       INTERIM HISTORY: Patient was seen in her room,  at bedside.  She  "understands the risk of bleeding with her liver disease and the apixaban, but still concerned about being  her high risk of coagulation and clot burden.  No new symptoms today.       MEDICATIONS:  Current Facility-Administered Medications   Medication     apixaban ANTICOAGULANT (ELIQUIS) tablet 2.5 mg     cefTRIAXone (ROCEPHIN) 2 g vial to attach to  ml bag for ADULTS or NS 50 ml bag for PEDS     lactulose (CHRONULAC) solution 15 mL     levothyroxine (SYNTHROID/LEVOTHROID) tablet 88 mcg     lidocaine (LMX4) cream     lidocaine 1 % 0.1-1 mL     melatonin tablet 1 mg     midodrine (PROAMATINE) tablet 10 mg     pantoprazole (PROTONIX) EC tablet 40 mg     polyethylene glycol (MIRALAX) Packet 17 g     QUEtiapine (SEROquel) tablet 300 mg     rifaximin (XIFAXAN) tablet 550 mg     senna-docusate (SENOKOT-S/PERICOLACE) 8.6-50 MG per tablet 1 tablet    Or     senna-docusate (SENOKOT-S/PERICOLACE) 8.6-50 MG per tablet 2 tablet     sodium chloride (PF) 0.9% PF flush 3 mL     sodium chloride (PF) 0.9% PF flush 3 mL         ALLERGIES:  No Known Allergies      PHYSICAL EXAM:  Vital signs:  Temp: 99.5  F (37.5  C) Temp src: Temporal BP: 104/56 Pulse: 89   Resp: 16 SpO2: 96 % O2 Device: None (Room air)     Weight: 84.1 kg (185 lb 4.8 oz)  Estimated body mass index is 29.46 kg/m  as calculated from the following:    Height as of 11/6/20: 1.689 m (5' 6.5\").    Weight as of this encounter: 84.1 kg (185 lb 4.8 oz).    GENERAL:  Female, in no acute distress.  Alert.  HEENT:  Normocephalic, atraumatic. Scleral injection.   LUNGS:  Nonlabored breathing, no cough or audible wheezing, able to speak full sentences.  MSK: Full ROM UE.    SKIN: Jaundiced  PSYCH: Mentation appears normal, insight and judgement intact        LABS:  CBC RESULTS:   Recent Labs   Lab Test 11/09/20  0613   WBC 14.8*   RBC 2.27*   HGB 8.1*   HCT 25.3*   *   MCH 35.7*   MCHC 32.0   RDW 19.2*          Recent Labs   Lab Test 11/09/20  0613 " 11/08/20  0724    140   POTASSIUM 3.2* 3.6   CHLORIDE 114* 112*   CO2 17* 17*   ANIONGAP 11 11   GLC 87 101*   BUN 17 17   CR 0.96 1.03   DEONTE 8.0* 8.0*         Yvonne Menendez PA-C  Hematology/Oncology  [facility]

## 2020-11-09 NOTE — PLAN OF CARE
VSS, except hypotensive. A&Ox4, lethargic. Denies pain and n/v. Baseline n/t to lower extremities. Tolerating low sodium diet. Frequent soft, tan BMs this shift. Voiding without difficulty, tea-colored urine. Ambulated w/ assist of 1, gait belt & walker. Intermittent dizziness. Ultrasound completed, paracentesis not recommended. Plan of care reviewed with pt and spouse.

## 2020-11-10 NOTE — PLAN OF CARE
Reviewed discharge instructions and medications with patient and spouse. Questions answered. Patient discharged to home with spouse, discharge instructions, medications (rifaximin sent to Rx, Eliquis patient reports having sufficient amounts of at home), and belongings at this time.

## 2020-11-10 NOTE — PLAN OF CARE
VSS ex: soft pressures, MD made aware, no intervention. A&Ox4. Diet: 2g Na.   LPIV: SL. Denies pain and Nausea. Pt is pleasant and makes needs known. Possible discharge today. Will continue plan of care.

## 2020-11-10 NOTE — PLAN OF CARE
[id number] temp 100.4 temporal. Denies pain. Up SBA to the bathroom. Abdomen distented, active bowel sounds. Sclera yellow. Bed alarm on for safety.

## 2020-11-10 NOTE — PROGRESS NOTES
Oncology:     Chart check: History of factor V Leiden and recurrent thromboembolism.  Hematology at the [facility] recommended lifelong anticoagulation, but hepatology concerned about her severe liver disease and bleeding risk. We recommended that she restart apixaban until she can follow up with Hematology at the [facility]. No new recommendations. Please call with any questions.       Yvonne Menendez PA-C  Hematology/Oncology  [facility]

## 2020-11-10 NOTE — DISCHARGE SUMMARY
[facility] Discharge Summary    Caroline Barajas MRN# [id number]   Age: 55 year old YOB: 1965     Date of Admission:  11/7/2020  Date of Discharge::  11/10/2020  Admitting Physician:  Mitzy Ritchie MD  Discharge Physician:  Gilbert Washington MD     Home clinic: [facility]          Admission Diagnoses:   chronic alcoholic liver disease  Hepatic encephalopathy (H)          Discharge Diagnosis:   Active Problems:    Hepatic encephalopathy (H)            Procedures:   US Abdomen          Medications Prior to Admission:     Medications Prior to Admission   Medication Sig Dispense Refill Last Dose     CONSTULOSE 10 GM/15ML solution Take 15 mLs by mouth daily   11/6/2020 at Unknown time     levothyroxine (SYNTHROID/LEVOTHROID) 88 MCG tablet Take 88 mcg by mouth At Bedtime    Past Week at Unknown time     melatonin 3 MG tablet Take 3 mg by mouth nightly as needed for sleep   Past Week at Unknown time     midodrine (PROAMATINE) 10 MG tablet Take 10 mg by mouth 3 times daily   11/6/2020 at Unknown time     multivitamin w/minerals (THERA-VIT-M) tablet Take 1 tablet by mouth daily   Past Week at Unknown time     pantoprazole (PROTONIX) 40 MG EC tablet Take 40 mg by mouth At Bedtime    11/6/2020 at Unknown time     promethazine (PHENERGAN) 25 MG tablet Take 1 tablet by mouth every 4 hours as needed for nausea   Past Week at Unknown time     QUEtiapine (SEROQUEL) 300 MG tablet Take 300 mg by mouth At Bedtime   11/6/2020 at Unknown time     [DISCONTINUED] rifaximin (XIFAXAN) 550 MG TABS tablet Take 550 mg by mouth 2 times daily   11/3/2020 at Waiting on fill from pharmacy             Discharge Medications:     Current Discharge Medication List      START taking these medications    Details   apixaban ANTICOAGULANT (ELIQUIS) 2.5 MG tablet Take 1 tablet (2.5 mg) by mouth 2 times daily    Associated Diagnoses: History of pulmonary embolism         CONTINUE these medications which have CHANGED     Details   rifaximin (XIFAXAN) 550 MG TABS tablet Take 1 tablet (550 mg) by mouth 2 times daily  Qty: 60 tablet, Refills: 0    Associated Diagnoses: Hepatic encephalopathy (H)         CONTINUE these medications which have NOT CHANGED    Details   CONSTULOSE 10 GM/15ML solution Take 15 mLs by mouth daily      levothyroxine (SYNTHROID/LEVOTHROID) 88 MCG tablet Take 88 mcg by mouth At Bedtime       melatonin 3 MG tablet Take 3 mg by mouth nightly as needed for sleep      midodrine (PROAMATINE) 10 MG tablet Take 10 mg by mouth 3 times daily      multivitamin w/minerals (THERA-VIT-M) tablet Take 1 tablet by mouth daily      pantoprazole (PROTONIX) 40 MG EC tablet Take 40 mg by mouth At Bedtime       promethazine (PHENERGAN) 25 MG tablet Take 1 tablet by mouth every 4 hours as needed for nausea      QUEtiapine (SEROQUEL) 300 MG tablet Take 300 mg by mouth At Bedtime                   Consultations:   Consultation during this admission received from hematology          Hospital Course:   Caroline Barajas is a 55 year old female with past medical history significant for alcoholic hepatitis and multiple admissions since dx in 2017.      PMH is notable for on-going etoh abuse as well as history of factor V Leiden deficiency on Eliquis for DVT/PE was a direct admit for abnormal labs and concern for SBP. Patient was hospitalized in October 2020, discharged to TCU and recently was discharged home from TCU.      On 11/6/2020, Ms Barajas was seen by her hepatologist at the [place] who was concerned about SBP given leukocytosis, abdominal discomfort.  She was directly admitted from her Liver doctor's office due to concern for possible SBP.     Please see the full admission note completed by Dr. Chowdary as well as the comprehensive review of her Alcoholic Hepatitis history by Dr. Moser, [facility].    Ms. Barajas was admitted to the hospital with concern for possible SBP as a cause of her apparent mild disorientation which  was diagnosed as encephalopathy. She did complain of abd pain with bloating, but also had a significantly elevated WBC (24.5).  She was seen in consultation by Dr. Lord from GI, and he agreed with recommendations to cover for SP but to also obtain ascites for testing. She was empirically started on Ceftriaxone and IR was asked to obtain fluid.     The US did not show enough fluid to tap, so that route of eval was scuttled. She completed 5 days of IV antibiotics here in the hospital, but never became frankly febrile. Her WBC drifted down as far as 14.8, but subsequently went back up. She did also develop a low grade fever, but clinically appeared significantly improved.     I spoke with her , who was at the bedside on the date of admission and he felt comfortable with the plan to get her back home and to help her get into some type of Etoh therapy.     BP (!) 89/40 (BP Location: Right arm)   Pulse 73   Temp 96.9  F (36.1  C) (Temporal)   Resp 16   Wt 85.2 kg (187 lb 14.4 oz)   SpO2 99%   BMI 29.87 kg/m    On the date of discharge, Ms. Barajas was alert and coherent. She is extremely jaundiced and ill-appearing.   I examined her on the date of discharge. She got up with PT and was able to ambulate around the room to a degree that her  thought was satisfactory for him to support her at home.  Her abdomen is distended and mildly tender over the right upper quadrant.          Discharge Instructions and Follow-Up:   Discharge diet: Low salt   Discharge activity: Activity as tolerated   Discharge follow-up: With your Liver doctor as planned.            Discharge Disposition:   Discharged to home      Attestation:  I have reviewed today's vital signs, notes, medications, labs and imaging.  Total time: 35 minutes    Gilbert Washington MD

## 2020-11-10 NOTE — PROVIDER NOTIFICATION
"MD paged \"Is the plan still for patient to d/c? Please note WBC increased from 14.8 to 21.0 and SBPs in 80s this AM. Currently SBP low 100s  Thanks,\"    Awaiting response.     Addendum: Discharge order placed.   "

## 2020-11-10 NOTE — PLAN OF CARE
Presentation/Diagnosis: Hepatic Encephalopathy  History: Opioid dependency, lumbar disc displacement, alcoholism, Factor V  Labs/Protocols: Elevated liver enzymes, K 3.7, WBC 14.8  Vitals: Stable, T max on days 100.2  Cardiac: Trace BLE and generalized edema  Telemetry: N/a  Respiratory: WDL, IS encouraged  Neuro: Disoriented to time. Baseline numb/tingle to BLE  GI/: Inc @ times, Loose stools r/t lactulose use.   Skin: Intact.   LDAs: PIV SL.   Diet: Reg, 2g Na restricted  Activity: SBA  Plan: No need for paracentesis after US showed fluid improved, continue IV abx for potential peritoneal infection. GI following. PT following. Discharge home when medically stable.

## 2020-11-11 NOTE — PLAN OF CARE
Physical Therapy Discharge Summary    Reason for therapy discharge:    Discharged to home.    Progress towards therapy goal(s). See goals on Care Plan in [facility] electronic health record for goal details.  Goals not met.  Barriers to achieving goals:   discharge from facility.    Therapy recommendation(s):    Continued therapy is recommended.  Rationale/Recommendations:  Home therapy recommended to progress safety and independence with mobility.

## 2020-11-12 PROBLEM — K65.2 SBP (SPONTANEOUS BACTERIAL PERITONITIS) (H): Status: ACTIVE | Noted: 2020-01-01

## 2020-11-12 PROBLEM — K70.10 ALCOHOLIC HEPATITIS WITHOUT ASCITES (H): Status: ACTIVE | Noted: 2020-01-01

## 2020-11-12 NOTE — PROGRESS NOTES
Received a request from Dr. Tari Moser to start following this pt for care coordination. Pt is a 60 year old female with PMH significant for alcohol use disorder who presented for follow up of severe alcoholic hepatitis. Pt admitted to the hospital on 10/10/2020 after being found unresponsive by spouse and admitted for hepatic encephalopathy and acute hepatitis. Pt had hepatology appointment on 11/6 with Dr. Moser and was instructed to present to the ED for infectious rule out after labs showed a WBC of 25. Pt hospitalized at [facility] from 11/7-11/10 and medical team was unable to obtain enough ascites to test for diagnostics and rule out SBP. Pt received a 5 day course of IV antibiotics.   Pt was discharged on eliquis for history of factor V Leiden and recurrent thromboembolism.    Connected with pt's spouse, Guillaume, to introduce self and RN care coordinator role and to check in on pt post hospital discharge. Guillaume stated that pt is doing well in the mornings but gets more fatigued and confused as the day progresses. Pt is able to ambulate to/from the bathroom with assist of Guillaume and walker. Per Guillaume, pt will have home care for nursing and PT through [facility] and Guillaume is waiting for a call back to discuss when these services can start. Pt is taking lactulose 15 ml daily and having an average of 3 BMs per day. Pt and Guillaume are waiting for prior authorization from pt's insurance for prescription of xifaxan 550 mg twice daily. Notified Guillaume that writer will follow up in the status of this prior authorization. Reviewed lactulose titration to achieve 3-5 BMs/day. Per Guillaume, pt has only had 1 BM so far today and writer encouraged Guillaume to give pt another dose of lactulose if pt has had not had another BM by this evening.     Guillaume denied noticing abdominal distension and lower extremity edema. Pt is still holding diuretics. Pt has not complained of any abdominal pain or discomfort.     Reviewed plan for pt to have weekly  labs drawn at primary care clinic and to have hepatology follow up in 1 month. Guillaume confirmed that pt goes to [facility] for primary care and requested orders be faxed there. Pt scheduled for 1 month follow up on 12/8 with Dr. Moser.

## 2020-11-12 NOTE — PROGRESS NOTES
Prior Authorization Not Needed per Insurance        Medication: Rifaximin 550 mg-PA NOT NEEDED   Insurance Company: [employer] - Phone [phone] Fax [phone]  Expected CoPay:  NO CO-PAY    Pharmacy Filling the Rx: [facility] [id number] [id number] [facility]  Pharmacy Notified: Yes  Patient Notified: No    Called pharmacy and pharmacy stated that PA is Not Needed and medication is covered. **Instructed pharmacy to notify patient when script is ready to /ship.**Pharmacy stated that they have a paid claim and will notify the patient when medication is ready for . Insurance also stated that PA is Not Needed and medication is covered.

## 2020-11-16 NOTE — PROGRESS NOTES
Connected with pt's spouse, Guillaume, who stated that pt is currently in the hospital. Guillaume had to call EMS after pt became progressively weak and confused over the weekend. Guillaume reported giving pt lactulose 15 ml in the am and 7 ml in the pm and pt was having 3 BMs per day. Guillaume also picked up prescription for xifaxan from pharmacy and was administering medication 2 times daily as prescribed.     Will continue to follow plan of care while pt is hospitalized and reach out to pt on discharge. Guillaume plans to call writer with any questions or concerns.

## 2020-11-24 NOTE — PROGRESS NOTES
Pt discharged from hospital on 11/20 to home on hospice. Called pt's spouse, Guillaume, to check in and see if pt writer can assist pt prior to ending care coordination episode. Guillaume confirmed that pt is home on hospice and stated that pt's sister, Tari, is at the house helping with pt. Guillaume stated that Tari had some questions about being a living donor candidate for pt. Reviewed with Guillaume and Tari that pt would need to be sober for at least 6 months prior to being considered for transplant evaluation. Briefly reviewed transplant evaluation process and that a committee would decide if pt is an appropriate candidate for trasnplant. Guillaume requested a call from Dr. Moser to review pt's options and Dr. Moser's recommendations. Message sent to Dr. Moser detailing Guillaume's request.